# Patient Record
Sex: MALE | Race: WHITE | Employment: FULL TIME | URBAN - METROPOLITAN AREA
[De-identification: names, ages, dates, MRNs, and addresses within clinical notes are randomized per-mention and may not be internally consistent; named-entity substitution may affect disease eponyms.]

---

## 2017-11-13 ENCOUNTER — ALLIED HEALTH/NURSE VISIT (OUTPATIENT)
Dept: NURSING | Facility: CLINIC | Age: 42
End: 2017-11-13
Payer: COMMERCIAL

## 2017-11-13 DIAGNOSIS — Z23 NEED FOR PROPHYLACTIC VACCINATION AND INOCULATION AGAINST INFLUENZA: Primary | ICD-10-CM

## 2017-11-13 PROCEDURE — 99207 ZZC NO CHARGE NURSE ONLY: CPT

## 2017-11-13 PROCEDURE — 90686 IIV4 VACC NO PRSV 0.5 ML IM: CPT

## 2017-11-13 PROCEDURE — 90471 IMMUNIZATION ADMIN: CPT

## 2017-11-13 NOTE — PROGRESS NOTES

## 2017-11-13 NOTE — MR AVS SNAPSHOT
After Visit Summary   11/13/2017    Cy Harris    MRN: [id number]           Patient Information     Date Of Birth          1975        Visit Information        Provider Department      11/13/2017 4:00 PM  NURSE [facility]        Today's Diagnoses     Need for prophylactic vaccination and inoculation against influenza    -  1       Follow-ups after your visit        Who to contact     If you have questions or need follow up information about today's clinic visit or your schedule please contact [facility] directly at [phone].  Normal or non-critical lab and imaging results will be communicated to you by [employer], letter or phone within 4 business days after the clinic has received the results. If you do not hear from us within 7 days, please contact the clinic through "[employer]t or phone. If you have a critical or abnormal lab result, we will notify you by phone as soon as possible.  Submit refill requests through [employer] or call your pharmacy and they will forward the refill request to us. Please allow 3 business days for your refill to be completed.          Additional Information About Your Visit        MyChart Information     [employer] gives you secure access to your electronic health record. If you see a primary care provider, you can also send messages to your care team and make appointments. If you have questions, please call your primary care clinic.  If you do not have a primary care provider, please call [phone] and they will assist you.        Care EveryWhere ID     This is your Care EveryWhere ID. This could be used by other organizations to access your [facility] medical records  [id number]         Blood Pressure from Last 3 Encounters:   04/15/15 123/68   04/14/15 118/70   04/09/15 112/75    Weight from Last 3 Encounters:   04/15/15 148 lb (67.1 kg)   04/14/15 148 lb 8 oz (67.4 kg)   04/09/15 147 lb (66.7 kg)              We Performed the  Following     FLU VAC, SPLIT VIRUS IM > 3 YO (QUADRIVALENT) [97271]     Vaccine Administration, Initial [[id number]]        Primary Care Provider Office Phone # Fax #    Kevin Phelan [phone] [address] [place] [address]        Equal Access to Services     [facility]: Hadii aad ku hadasho Soomaali, waaxda luqadaha, qaybta kaalmada adeegyada, waxay ramosin haycindan adesunita shaymalena laazul castro. So [facility] [phone].    ATENCIÓN: Si habla español, tiene a morton disposición servicios gratuitos de asistencia lingüística. Ll[facility] [phone].    We comply with applicable federal civil rights laws and [place] laws. We do not discriminate on the basis of race, color, national origin, age, disability, sex, sexual orientation, or gender identity.            Thank you!     Thank you for choosing [facility]  for your care. Our goal is always to provide you with excellent care. Hearing back from our patients is one way we can continue to improve our services. Please take a few minutes to complete the written survey that you may receive in the mail after your visit with us. Thank you!             Your Updated Medication List - Protect others around you: Learn how to safely use, store and throw away your medicines at www.disposemymeds.org.          This list is accurate as of: 11/13/17  4:17 PM.  Always use your most recent med list.                   Brand Name Dispense Instructions for use Diagnosis    CLARITIN 10 MG tablet   Generic drug:  loratadine     30 tablet    Take 1 tablet (10 mg) by mouth daily        HYDROcodone-acetaminophen 5-325 MG per tablet    NORCO    30 tablet    Take 1-2 tablets by mouth every 4 hours as needed for other (Moderate to Severe Pain)    Inguinal hernia without mention of obstruction or gangrene, bilateral, (not specified as recurrent)       MULTI-VITAMIN GUMMIES PO      Take 2 tablets by mouth daily

## 2017-12-24 ENCOUNTER — NURSE TRIAGE (OUTPATIENT)
Dept: NURSING | Facility: CLINIC | Age: 42
End: 2017-12-24

## 2017-12-24 NOTE — TELEPHONE ENCOUNTER
"  Reason for Disposition    Caller has medication question about med not prescribed by PCP and triager unable to answer question (e.g., compatibility with other med, storage)     Caller had questions about using Flonase with Afrin and sudafed.    Spoke with [employer] control, questions answered about use.  Space flonase and Afrin apart by 1-2 hours, ok to use together.    Afrin and Sudafed have same action, don't use together, use one or the other.    Additional Information    Negative: Drug overdose and nurse unable to answer question    Negative: Caller requesting information not related to medicine    Negative: Caller requesting a prescription for Strep throat and has a positive culture result    Negative: Rash while taking a medication or within 3 days of stopping it    Negative: Immunization reaction suspected    Negative: [1] Asthma and [2] having symptoms of asthma (cough, wheezing, etc)    Negative: MORE THAN A DOUBLE DOSE of a prescription or over-the-counter (OTC) drug    Negative: [1] DOUBLE DOSE (an extra dose or lesser amount) of over-the-counter (OTC) drug AND [2] any symptoms (e.g., dizziness, nausea, pain, sleepiness)    Negative: [1] DOUBLE DOSE (an extra dose or lesser amount) of prescription drug AND [2] any symptoms (e.g., dizziness, nausea, pain, sleepiness)    Negative: Took another person's prescription drug    Negative: [1] DOUBLE DOSE (an extra dose or lesser amount) of prescription drug AND [2] NO symptoms (Exception: a double dose of antibiotics)    Negative: Diabetes drug error or overdose (e.g., insulin or extra dose)    Negative: [1] Request for URGENT new prescription or refill of \"essential\" medication (i.e., likelihood of harm to patient if not taken) AND [2] triager unable to fill per unit policy    Negative: [1] Prescription not at pharmacy AND [2] was prescribed today by PCP    Negative: Pharmacy calling with prescription questions and triager unable to answer question    " Negative: Caller has URGENT medication question about med that PCP prescribed and triager unable to answer question    Negative: Caller has NON-URGENT medication question about med that PCP prescribed and triager unable to answer question    Negative: Caller requesting a NON-URGENT new prescription or refill and triager unable to refill per unit policy    Protocols used: MEDICATION QUESTION CALL-ADULT-      Sangeetha Freeman, RN  [facility]

## 2018-01-05 ENCOUNTER — OFFICE VISIT (OUTPATIENT)
Dept: FAMILY MEDICINE | Facility: CLINIC | Age: 43
End: 2018-01-05
Payer: COMMERCIAL

## 2018-01-05 VITALS
WEIGHT: 154.7 LBS | DIASTOLIC BLOOD PRESSURE: 78 MMHG | TEMPERATURE: 97.6 F | HEIGHT: 68 IN | SYSTOLIC BLOOD PRESSURE: 120 MMHG | RESPIRATION RATE: 16 BRPM | HEART RATE: 72 BPM | BODY MASS INDEX: 23.45 KG/M2 | OXYGEN SATURATION: 100 %

## 2018-01-05 DIAGNOSIS — H69.91 DYSFUNCTION OF RIGHT EUSTACHIAN TUBE: Primary | ICD-10-CM

## 2018-01-05 PROCEDURE — 99213 OFFICE O/P EST LOW 20 MIN: CPT | Performed by: FAMILY MEDICINE

## 2018-01-05 RX ORDER — PREDNISONE 50 MG/1
TABLET ORAL DAILY
COMMUNITY
End: 2021-10-15

## 2018-01-05 RX ORDER — FLUTICASONE PROPIONATE 50 MCG
1 SPRAY, SUSPENSION (ML) NASAL DAILY
COMMUNITY

## 2018-01-05 NOTE — MR AVS SNAPSHOT
After Visit Summary   1/5/2018    Cy Harris    MRN: [id number]           Patient Information     Date Of Birth          1975        Visit Information        Provider Department      1/5/2018 9:50 AM Renetta Velasquez MD [facility]        Care Instructions    Guaifenisen may be helpful for the sinus symptoms. This is now available over the counter in the dose of 600 mg each (Mucinex and Humibid are a couple brand names). This helps break up mucus, and can help with drainage.  You can take 1-2 tabs twice per day.    Keep up with fluids (water).  Humidity can be helpful, such as showers, warm tea.    Affrin can help as you get on the airplaine. You do not want to use this more than a couple days in the row, because you can get rebound symptoms.            Follow-ups after your visit        Who to contact     If you have questions or need follow up information about today's clinic visit or your schedule please contact [facility] directly at [phone].  Normal or non-critical lab and imaging results will be communicated to you by [employer], letter or phone within 4 business days after the clinic has received the results. If you do not hear from us within 7 days, please contact the clinic through [employer] or phone. If you have a critical or abnormal lab result, we will notify you by phone as soon as possible.  Submit refill requests through [employer] or call your pharmacy and they will forward the refill request to us. Please allow 3 business days for your refill to be completed.          Additional Information About Your Visit        [employer]cracy Information     [employer] gives you secure access to your electronic health record. If you see a primary care provider, you can also send messages to your care team and make appointments. If you have questions, please call your primary care clinic.  If you do not have a primary care provider, please call [phone] and they will assist  "you.        Care EveryWhere ID     This is your Care EveryWhere ID. This could be used by other organizations to access your [facility] medical records  [id number]        Your Vitals Were     Pulse Temperature Respirations Height Pulse Oximetry BMI (Body Mass Index)    72 97.6  F (36.4  C) (Oral) 16 5' 8\" (1.727 m) 100% 23.52 kg/m2       Blood Pressure from Last 3 Encounters:   01/05/18 120/78   04/15/15 123/68   04/14/15 118/70    Weight from Last 3 Encounters:   01/05/18 154 lb 11.2 oz (70.2 kg)   04/15/15 148 lb (67.1 kg)   04/14/15 148 lb 8 oz (67.4 kg)              Today, you had the following     No orders found for display       Primary Care Provider Office Phone # Fax #    Kevin Phelan [phone] [address] [place] [address]        Equal Access to Services     [facility]: Hadii aad ku hadasho Soomaali, waaxda luqadaha, qaybta kaalmada adeegyada, waxay idiin haycindan howard carlin . So [facility] [phone].    ATENCIÓN: Si habla español, tiene a morton disposición servicios gratuitos de asistencia lingüística. Llame al [phone].    We comply with applicable federal civil rights laws and [place] laws. We do not discriminate on the basis of race, color, national origin, age, disability, sex, sexual orientation, or gender identity.            Thank you!     Thank you for choosing [facility]  for your care. Our goal is always to provide you with excellent care. Hearing back from our patients is one way we can continue to improve our services. Please take a few minutes to complete the written survey that you may receive in the mail after your visit with us. Thank you!             Your Updated Medication List - Protect others around you: Learn how to safely use, store and throw away your medicines at www.disposemymeds.org.          This list is accurate as of: 1/5/18 10:24 AM.  Always use your most recent med list.                   Brand Name " Dispense Instructions for use Diagnosis    AMOXICILLIN PO      Take by mouth daily        AUGMENTIN 875-125 MG per tablet   Generic drug:  amoxicillin-clavulanate      Take 1 tablet by mouth 2 times daily For 10 days        CLARITIN 10 MG tablet   Generic drug:  loratadine     30 tablet    Take 1 tablet (10 mg) by mouth daily        FLONASE ALLERGY RELIEF 50 MCG/ACT spray   Generic drug:  fluticasone      Spray 1 spray into both nostrils daily        HYDROcodone-acetaminophen 5-325 MG per tablet    NORCO    30 tablet    Take 1-2 tablets by mouth every 4 hours as needed for other (Moderate to Severe Pain)    Inguinal hernia without mention of obstruction or gangrene, bilateral, (not specified as recurrent)       MULTI-VITAMIN GUMMIES PO      Take 2 tablets by mouth daily        predniSONE 50 MG tablet    DELTASONE     Take by mouth daily For 5 days

## 2018-01-05 NOTE — PROGRESS NOTES
SUBJECTIVE:   Cy Harris is a 42 year old male who presents to clinic today for the following health issues:      Ear issue      Duration:  X 2 weeks- is a     Description (location/character/radiation): right ear- plugged     Intensity:  Mild to moderate    Accompanying signs and symptoms: a little sinus congestion    History (similar episodes/previous evaluation): yes    Precipitating or alleviating factors: Went to the [facility] on Sunday placed on amocillin and prednisone    Therapies tried and outcome: amoxicillin and prednisone, mucincex, afrin, flonase, sudafed             Problem list and histories reviewed & adjusted, as indicated.  Additional history:     See under ROS     Patient Active Problem List   Diagnosis     CARDIOVASCULAR SCREENING; LDL GOAL LESS THAN 160     Bilateral inguinal hernia       Current Outpatient Prescriptions   Medication Sig Dispense Refill     AMOXICILLIN PO Take by mouth daily       predniSONE (DELTASONE) 50 MG tablet Take by mouth daily       fluticasone (FLONASE ALLERGY RELIEF) 50 MCG/ACT spray Spray 1 spray into both nostrils daily       HYDROcodone-acetaminophen (NORCO) 5-325 MG per tablet Take 1-2 tablets by mouth every 4 hours as needed for other (Moderate to Severe Pain) (Patient not taking: Reported on 1/5/2018) 30 tablet 0     Multiple Vitamins-Minerals (MULTI-VITAMIN GUMMIES PO) Take 2 tablets by mouth daily       loratadine (CLARITIN) 10 MG tablet Take 1 tablet (10 mg) by mouth daily 30 tablet 1       Reviewed and updated as needed this visit by clinical staffTobacco  Allergies  Med Hx  Surg Hx  Fam Hx  Soc Hx      Reviewed and updated as needed this visit by Provider         ROS:  CONSTITUTIONAL:NEGATIVE for fever, chills, change in weight  ENT/MOUTH: see below  RESP:NEGATIVE for significant cough or SOB  PSYCHIATRIC: NEGATIVE for changes in mood or affect    Right ear bothering him currently.  He is a ; would like things looked at prior to flying  "again this coming weekend.    Started a month ago.  Had an infection.  Ear popping.    Sudafed helped.  Stayed home.  Using flonase.   Last year with similar issue.  flonase was helpful then.    Using saline.    Last week had a 3 day trip. .  First day, left ear unplugged.  Right ear has stayed plugged.  Not much pain. Only pain was brief right ear.  Was blowing lots of stuff from nose.     Sunday am, went to [facility].  Was noted to have right OM.    Supposed to go back in on Sunday.  Notes ear was popping on his way over here today.     OBJECTIVE:     /78 (BP Location: Right arm, Cuff Size: Adult Regular)  Pulse 72  Temp 97.6  F (36.4  C) (Oral)  Resp 16  Ht 5' 8\" (1.727 m)  Wt 154 lb 11.2 oz (70.2 kg)  SpO2 100%  BMI 23.52 kg/m2  Body mass index is 23.52 kg/(m^2).  GENERAL APPEARANCE: alert and no distress  HENT: ear canals and TM's normal  CV: regular rates and rhythm  PSYCH: mentation appears normal and affect normal/bright    Reviewed chart note in Care Everywhere from [facility].     ASSESSMENT/PLAN:       Dysfunction of right eustachian tube  Not real painful at this time, but his job is as a .   He was recently dx with OM and treated with antibiotics and prednisone. Last prednisone is today.    Discussed what this is, with a diagram.  Discussed mucinex, fluids.  Could use afrin for short term; not to use chronically due to rebound symptoms.   He is on flonase.   Will complete the antibiotic and prednisone.   Discussed seeing ENT if not improving; he will contact us if wanting to proceed.   He anticipates he may take a short term leave from this upcoming 4-5 day flight; after that he will have some on the ground training.       Follow up prn or as previously directed.    Patient Instructions   Guaifenisen may be helpful for the sinus symptoms. This is now available over the counter in the dose of 600 mg each (Mucinex and Humibid are a couple brand names). This helps break up " mucus, and can help with drainage.  You can take 1-2 tabs twice per day.    Keep up with fluids (water).  Humidity can be helpful, such as showers, warm tea.    Affrin can help as you get on the airplaine. You do not want to use this more than a couple days in the row, because you can get rebound symptoms.        Renetta Velasquez MD, MD  [facility]

## 2018-01-05 NOTE — PATIENT INSTRUCTIONS
Guaifenisen may be helpful for the sinus symptoms. This is now available over the counter in the dose of 600 mg each (Mucinex and Humibid are a couple brand names). This helps break up mucus, and can help with drainage.  You can take 1-2 tabs twice per day.    Keep up with fluids (water).  Humidity can be helpful, such as showers, warm tea.    Affrin can help as you get on the airplaine. You do not want to use this more than a couple days in the row, because you can get rebound symptoms.

## 2018-01-11 ENCOUNTER — TELEPHONE (OUTPATIENT)
Dept: FAMILY MEDICINE | Facility: CLINIC | Age: 43
End: 2018-01-11

## 2018-01-11 NOTE — TELEPHONE ENCOUNTER
Reason for call:  Form   Our goal is to have forms completed within 72 hours, however some forms may require a visit or additional information.     Who is the form from? Patient  Where did the form come from? Patient or family brought in     What clinic location was the form placed at? [facility] near    Where was the form placed? 's Box  What number is listed as a contact on the form? [phone]      Phone call message - patient request for a letter, form or note:     Date needed: as soon as possible  Please fax to [phone]  Has the patient signed a consent form for release of information? YES    Additional comments: please mail original to the patient once its complete     Type of letter, form or note: medical      Phone number to reach patient:  Home number on file [phone] (home)    Best Time:  Any     Can we leave a detailed message on this number?  YES

## 2018-11-15 ENCOUNTER — ALLIED HEALTH/NURSE VISIT (OUTPATIENT)
Dept: NURSING | Facility: CLINIC | Age: 43
End: 2018-11-15
Payer: COMMERCIAL

## 2018-11-15 DIAGNOSIS — Z23 NEED FOR PROPHYLACTIC VACCINATION AND INOCULATION AGAINST INFLUENZA: Primary | ICD-10-CM

## 2018-11-15 PROCEDURE — 90686 IIV4 VACC NO PRSV 0.5 ML IM: CPT

## 2018-11-15 PROCEDURE — 90471 IMMUNIZATION ADMIN: CPT

## 2018-11-15 NOTE — MR AVS SNAPSHOT
After Visit Summary   11/15/2018    Cy Harris    MRN: [id number]           Patient Information     Date Of Birth          1975        Visit Information        Provider Department      11/15/2018 3:50 PM [facility] NURSE [facility]        Today's Diagnoses     Need for prophylactic vaccination and inoculation against influenza    -  1       Follow-ups after your visit        Your next 10 appointments already scheduled     Nov 15, 2018  3:50 PM CST   Nurse Only with [facility] NURSE   [facility] ([facility])    [address]  [address]  [facility] [address] [phone]              Who to contact     If you have questions or need follow up information about today's clinic visit or your schedule please contact [facility] directly at [phone].  Normal or non-critical lab and imaging results will be communicated to you by MyChart, letter or phone within 4 business days after the clinic has received the results. If you do not hear from us within 7 days, please contact the clinic through MyChart or phone. If you have a critical or abnormal lab result, we will notify you by phone as soon as possible.  Submit refill requests through [employer] or call your pharmacy and they will forward the refill request to us. Please allow 3 business days for your refill to be completed.          Additional Information About Your Visit        MyChart Information     [employer] gives you secure access to your electronic health record. If you see a primary care provider, you can also send messages to your care team and make appointments. If you have questions, please call your primary care clinic.  If you do not have a primary care provider, please call [phone] and they will assist you.        Care EveryWhere ID     This is your Care EveryWhere ID. This could be used by other organizations to access your [facility] medical records  [id number]          Blood Pressure from Last 3 Encounters:   01/05/18 120/78   04/15/15 123/68   04/14/15 118/70    Weight from Last 3 Encounters:   01/05/18 154 lb 11.2 oz (70.2 kg)   04/15/15 148 lb (67.1 kg)   04/14/15 148 lb 8 oz (67.4 kg)              We Performed the Following     FLU VACCINE, SPLIT VIRUS, IM (QUADRIVALENT) [22130]- >3 YRS     Vaccine Administration, Initial [[id number]]        Primary Care Provider Office Phone # Fax #    Kevin Phelan [phone] [address] [place] [address]        Equal Access to Services     [facility]: Nieves Multani, sam beaver, jadiel black, mohan castro. So [facility] [phone].    ATENCIÓN: Si habla español, tiene a morton disposición servicios gratuitos de asistencia lingüística. Llame al [phone].    We comply with applicable federal civil rights laws and [place] laws. We do not discriminate on the basis of race, color, national origin, age, disability, sex, sexual orientation, or gender identity.            Thank you!     Thank you for choosing [facility]  for your care. Our goal is always to provide you with excellent care. Hearing back from our patients is one way we can continue to improve our services. Please take a few minutes to complete the written survey that you may receive in the mail after your visit with us. Thank you!             Your Updated Medication List - Protect others around you: Learn how to safely use, store and throw away your medicines at www.disposemymeds.org.          This list is accurate as of 11/15/18  3:45 PM.  Always use your most recent med list.                   Brand Name Dispense Instructions for use Diagnosis    AMOXICILLIN PO      Take by mouth daily        CLARITIN 10 MG tablet   Generic drug:  loratadine     30 tablet    Take 1 tablet (10 mg) by mouth daily        FLONASE ALLERGY RELIEF 50 MCG/ACT spray   Generic drug:  fluticasone       Spray 1 spray into both nostrils daily        HYDROcodone-acetaminophen 5-325 MG per tablet    NORCO    30 tablet    Take 1-2 tablets by mouth every 4 hours as needed for other (Moderate to Severe Pain)    Inguinal hernia without mention of obstruction or gangrene, bilateral, (not specified as recurrent)       MULTI-VITAMIN GUMMIES PO      Take 2 tablets by mouth daily        predniSONE 50 MG tablet    DELTASONE     Take by mouth daily For 5 days

## 2019-07-18 ENCOUNTER — OFFICE VISIT (OUTPATIENT)
Dept: PEDIATRICS | Facility: CLINIC | Age: 44
End: 2019-07-18
Payer: COMMERCIAL

## 2019-07-18 VITALS
OXYGEN SATURATION: 97 % | WEIGHT: 151.8 LBS | HEIGHT: 68 IN | DIASTOLIC BLOOD PRESSURE: 66 MMHG | HEART RATE: 78 BPM | TEMPERATURE: 97.8 F | SYSTOLIC BLOOD PRESSURE: 108 MMHG | BODY MASS INDEX: 23.01 KG/M2

## 2019-07-18 DIAGNOSIS — J32.9 SINUSITIS, UNSPECIFIED CHRONICITY, UNSPECIFIED LOCATION: Primary | ICD-10-CM

## 2019-07-18 PROCEDURE — 99213 OFFICE O/P EST LOW 20 MIN: CPT | Performed by: NURSE PRACTITIONER

## 2019-07-18 ASSESSMENT — MIFFLIN-ST. JEOR: SCORE: 1543.06

## 2019-07-18 NOTE — PROGRESS NOTES
Subjective     Cy Harris is a 44 year old male who presents to clinic today for the following health issues:    HPI   Acute Illness   Acute illness concerns: cough/sinus issue  Onset: started 7/5 - states went to [facility] and got antibiotics (amoxicillin) - finished - helped but still having issue    Fever: no     Chills/Sweats: no     Headache (location?): no     Sinus Pressure:no    Conjunctivitis:  no    Ear Pain: no    Rhinorrhea: YES- in mornings, back of throat drainage    Congestion: YES    Sore Throat: no     Cough: YES-productive of yellow sputum, productive of green sputum    Wheeze: no    Decreased Appetite: no    Nausea: no    Vomiting: no    Diarrhea:  no    Dysuria/Freq.: no    Fatigue/Achiness: no    Sick/Strep Exposure: no     Therapies Tried and outcome: sudafed, mucinex, claritin, flonase, and montelukast    Sx started 6/6. Had sinus pressure, fatigue, and nasal drainage.  [facility] Got [facility]in for sinusitis. Had sinus pressure, fatigue, and nasal drainage.   Is good about flonase, claritin, and Singulair because he has a history of allergies.  Has had cough with it, dry.  Taking Sudafed and Mucinex.     Thinks Augmentin helped. Has some thick drainage, less sinus pressure. Still has cough.       -------------------------------------    Patient Active Problem List   Diagnosis     CARDIOVASCULAR SCREENING; LDL GOAL LESS THAN 160     Bilateral inguinal hernia     Past Surgical History:   Procedure Laterality Date     HC TOOTH EXTRACTION W/FORCEP       HERNIORRHAPHY INGUINAL  12/26/2013    Procedure: HERNIORRHAPHY INGUINAL;  HERNIORRHAPHY INGUINAL- LEFT REPAIR WITH MESH ;  Surgeon: Bob Leigh MD;  Location:  OR     HERNIORRHAPHY INGUINAL Right 4/15/2015    Procedure: HERNIORRHAPHY INGUINAL;  Surgeon: Bob Leigh MD;  Location:  OR       Social History     Tobacco Use     Smoking status: Never Smoker     Smokeless tobacco: Never Used   Substance Use  "Topics     Alcohol use: Yes     Comment: 1-2 weekly     Family History   Problem Relation Age of Onset     Cancer Mother         Ovarian     Hypertension Mother      Cardiovascular Maternal Grandmother      Prostate Cancer Paternal Grandfather            Reviewed and updated as needed this visit by Provider         Review of Systems   ROS COMP: Constitutional, HEENT, cardiovascular, pulmonary, GI, , musculoskeletal, neuro, skin, endocrine and psych systems are negative, except as otherwise noted.      Objective    /66 (BP Location: Right arm, Cuff Size: Adult Regular)   Pulse 78   Temp 97.8  F (36.6  C) (Tympanic)   Ht 1.727 m (5' 8\")   Wt 68.9 kg (151 lb 12.8 oz)   SpO2 97%   BMI 23.08 kg/m    Body mass index is 23.08 kg/m .  Physical Exam   GENERAL: healthy, alert and no distress  NECK: no adenopathy, no asymmetry, masses, or scars and thyroid normal to palpation  RESP: lungs clear to auscultation - no rales, rhonchi or wheezes  CV: regular rate and rhythm, normal S1 S2, no S3 or S4, no murmur, click or rub, no peripheral edema and peripheral pulses strong    Diagnostic Test Results:  Labs reviewed in Epic        Assessment & Plan   (J32.9) Sinusitis, unspecified chronicity, unspecified location  (primary encounter diagnosis)  Comment: Improving. It sounds like there is no longer an infection but still having lots of drainage. Already on flonase, mucinex, and decongestants.  Plan:   Continue current cares. If no improvement in 1-2 weeks could consider steroids vs second round of antibiotics if it sounds like he's getting a second infection.   Will fill out [facility] paperwork. He is a  and doesn't wan to fly while congested.       See Patient Instructions    No follow-ups on file.    PILAR [facility] GARRY    "

## 2019-07-18 NOTE — PATIENT INSTRUCTIONS
Please message me Monday if not continuing to improve. We could consider a second course of antibiotics and oral steroids if needed.

## 2019-07-29 ENCOUNTER — TELEPHONE (OUTPATIENT)
Dept: PEDIATRICS | Facility: CLINIC | Age: 44
End: 2019-07-29

## 2019-07-29 NOTE — TELEPHONE ENCOUNTER
Letter received from [facility] to fill out paperwork for leave of absence by 8/2/19.  No paperwork enclosed.  Called [facility] at [phone] to request forms be sent.  Spoke with rep who will resend paperwork today.  Awaiting forms.  Dang Garcia, CMA

## 2020-02-23 ENCOUNTER — HEALTH MAINTENANCE LETTER (OUTPATIENT)
Age: 45
End: 2020-02-23

## 2020-12-06 ENCOUNTER — HEALTH MAINTENANCE LETTER (OUTPATIENT)
Age: 45
End: 2020-12-06

## 2021-04-11 ENCOUNTER — HEALTH MAINTENANCE LETTER (OUTPATIENT)
Age: 46
End: 2021-04-11

## 2021-06-23 ENCOUNTER — OFFICE VISIT (OUTPATIENT)
Dept: ORTHOPEDICS | Facility: CLINIC | Age: 46
End: 2021-06-23
Payer: COMMERCIAL

## 2021-06-23 VITALS
WEIGHT: 154 LBS | SYSTOLIC BLOOD PRESSURE: 138 MMHG | BODY MASS INDEX: 22.81 KG/M2 | HEIGHT: 69 IN | DIASTOLIC BLOOD PRESSURE: 92 MMHG

## 2021-06-23 DIAGNOSIS — S62.639A CLOSED FRACTURE OF TUFT OF DISTAL PHALANX OF FINGER: Primary | ICD-10-CM

## 2021-06-23 PROCEDURE — 99203 OFFICE O/P NEW LOW 30 MIN: CPT | Performed by: FAMILY MEDICINE

## 2021-06-23 ASSESSMENT — MIFFLIN-ST. JEOR: SCORE: 1550.98

## 2021-06-23 NOTE — LETTER
June 23, 2021      Cy Harris  [address] [address]        To Whom It May Concern:    Cy Harris  was seen on 6/23/21.  Please excuse him from work until 7/9/21 due to injury.        Sincerely,        Albert Yeo, MD

## 2021-06-23 NOTE — LETTER
6/23/2021         RE: Cy Harris  [address] [address]        Dear Colleague,    Thank you for referring your patient, Cy Harris, to the [facility] [place]. Please see a copy of my visit note below.    ASSESSMENT & PLAN  Patient Instructions     1. Closed fracture of tuft of distal phalanx of finger      -Patient has right middle finger pain due to distal phalanx fracture  -Patient was placed in a Stax splint today.  -Patient will perform gentle range of motion exercises to minimize stiffness of the finger.  Patient should avoid any heavy pushing or pulling with that finger to minimize displacement of the fracture  -Patient will be held out of work as a  until 7/9/2021.  -Patient will follow up on 7/9/21 for repeat x-rays and reevaluation  -Call direct clinic number [[phone]] at any time with questions or concerns.    Albert Yeo MD [facility] and [facility] Medicine  [facility] [place]          -----    SUBJECTIVE  Cy Harris is a/an 46 year old Right handed male who is seen as a self referral for evaluation of right hand pain. The patient is seen by themselves. States had 2nd covid vaccine on 5/9/21    Onset: 1 week(s) ago. Patient describes injury as was using a boat lift and hit his finger.   Location of Pain: right middle finger  Rating of Pain at worst: 6/10  Rating of Pain Currently: 2/10  Worsened by: fully bending of the finger or hitting it  Better with: Advil and tylenol  Treatments tried: ice, Tylenol, ibuprofen, previous imaging (xray 6/13/21 Images in Pacs) and finger splint  Associated symptoms: swelling, tingling and brusing  Orthopedic history: NO  Relevant surgical history: NO  Social history: social history: works at      Past Medical History:   Diagnosis Date     Inguinal hernia, left      PONV (postoperative nausea and vomiting)      Social History     Socioeconomic History     Marital status:   "    Spouse name: Not on file     Number of children: Not on file     Years of education: Not on file     Highest education level: Not on file   Occupational History     Not on file   Social Needs     Financial resource strain: Not on file     Food insecurity     Worry: Not on file     Inability: Not on file     Transportation needs     Medical: Not on file     Non-medical: Not on file   Tobacco Use     Smoking status: Never Smoker     Smokeless tobacco: Never Used   Substance and Sexual Activity     Alcohol use: Yes     Comment: 1-2 weekly     Drug use: No     Sexual activity: Not on file   Lifestyle     Physical activity     Days per week: Not on file     Minutes per session: Not on file     Stress: Not on file   Relationships     Social connections     Talks on phone: Not on file     Gets together: Not on file     Attends [employer] service: Not on file     Active member of club or organization: Not on file     Attends meetings of clubs or organizations: Not on file     Relationship status: Not on file     Intimate partner violence     Fear of current or ex partner: Not on file     Emotionally abused: Not on file     Physically abused: Not on file     Forced sexual activity: Not on file   Other Topics Concern     Parent/sibling w/ CABG, MI or angioplasty before 65F 55M? Not Asked   Social History Narrative     Not on file         Patient's past medical, surgical, social, and family histories were reviewed today and no changes are noted.    REVIEW OF SYSTEMS:  10 point ROS is negative other than symptoms noted above in HPI, Past Medical History or as stated below  Constitutional: NEGATIVE for fever, chills, change in weight  Skin: NEGATIVE for worrisome rashes, moles or lesions  GI/: NEGATIVE for bowel or bladder changes  Neuro: NEGATIVE for weakness, dizziness or paresthesias    OBJECTIVE:  BP (!) 138/92   Ht 1.74 m (5' 8.5\")   Wt 69.9 kg (154 lb)   BMI 23.08 kg/m     General: healthy, alert and in no " distress  HEENT: no scleral icterus or conjunctival erythema  Skin: no suspicious lesions or rash. No jaundice.  CV: regular rhythm by palpation  Resp: normal respiratory effort without conversational dyspnea   Psych: normal mood and affect  Gait: normal steady gait with appropriate coordination and balance  Neuro: normal light touch sensory exam of the bilateral hands.    MSK:  RIGHT HAND  Inspection:  Mild swelling and bruising over the distal third digit  Palpation:   Carpals: normal   Metacarpals: normal   Thumb: normal   Fingers: distal phalanx  Range of Motion:    flexion PIP limited by pain, flexion DIP limited by tightness limited by pain  Strength:    extension limited by pain, flexion limited by pain  Special Tests:    Positive: none    Negative: flexor digitorum superficialis testing, flexor digitorum profundus testing    Independent visualization of the below image:  No results found for this or any previous visit (from the past 24 hour(s)).    Personal review of xray of 3rd digit performed at outside center shows a mildly displaced comminuted fracture of the distal phalanx.        Albert Yeo MD [facility]        Again, thank you for allowing me to participate in the care of your patient.        Sincerely,        Albert Yeo, MD

## 2021-06-23 NOTE — PROGRESS NOTES
ASSESSMENT & PLAN  Patient Instructions     1. Closed fracture of tuft of distal phalanx of finger      -Patient has right middle finger pain due to distal phalanx fracture  -Patient was placed in a Stax splint today.  -Patient will perform gentle range of motion exercises to minimize stiffness of the finger.  Patient should avoid any heavy pushing or pulling with that finger to minimize displacement of the fracture  -Patient will be held out of work as a  until 7/9/2021.  -Patient will follow up on 7/9/21 for repeat x-rays and reevaluation  -Call direct clinic number [[phone]] at any time with questions or concerns.    Albert Yeo MD [facility] Orthopedics and Sports Medicine  [facility]          -----    SUBJECTIVE  Cy Harris is a/an 46 year old Right handed male who is seen as a self referral for evaluation of right hand pain. The patient is seen by themselves. States had 2nd covid vaccine on 5/9/21    Onset: 1 week(s) ago. Patient describes injury as was using a boat lift and hit his finger.   Location of Pain: right middle finger  Rating of Pain at worst: 6/10  Rating of Pain Currently: 2/10  Worsened by: fully bending of the finger or hitting it  Better with: Advil and tylenol  Treatments tried: ice, Tylenol, ibuprofen, previous imaging (xray 6/13/21 Images in Pacs) and finger splint  Associated symptoms: swelling, tingling and brusing  Orthopedic history: NO  Relevant surgical history: NO  Social history: social history: works at      Past Medical History:   Diagnosis Date     Inguinal hernia, left      PONV (postoperative nausea and vomiting)      Social History     Socioeconomic History     Marital status:      Spouse name: Not on file     Number of children: Not on file     Years of education: Not on file     Highest education level: Not on file   Occupational History     Not on file   Social Needs     Financial resource strain: Not on file     Food insecurity  "    Worry: Not on file     Inability: Not on file     Transportation needs     Medical: Not on file     Non-medical: Not on file   Tobacco Use     Smoking status: Never Smoker     Smokeless tobacco: Never Used   Substance and Sexual Activity     Alcohol use: Yes     Comment: 1-2 weekly     Drug use: No     Sexual activity: Not on file   Lifestyle     Physical activity     Days per week: Not on file     Minutes per session: Not on file     Stress: Not on file   Relationships     Social connections     Talks on phone: Not on file     Gets together: Not on file     Attends [employer] service: Not on file     Active member of club or organization: Not on file     Attends meetings of clubs or organizations: Not on file     Relationship status: Not on file     Intimate partner violence     Fear of current or ex partner: Not on file     Emotionally abused: Not on file     Physically abused: Not on file     Forced sexual activity: Not on file   Other Topics Concern     Parent/sibling w/ CABG, MI or angioplasty before 65F 55M? Not Asked   Social History Narrative     Not on file         Patient's past medical, surgical, social, and family histories were reviewed today and no changes are noted.    REVIEW OF SYSTEMS:  10 point ROS is negative other than symptoms noted above in HPI, Past Medical History or as stated below  Constitutional: NEGATIVE for fever, chills, change in weight  Skin: NEGATIVE for worrisome rashes, moles or lesions  GI/: NEGATIVE for bowel or bladder changes  Neuro: NEGATIVE for weakness, dizziness or paresthesias    OBJECTIVE:  BP (!) 138/92   Ht 1.74 m (5' 8.5\")   Wt 69.9 kg (154 lb)   BMI 23.08 kg/m     General: healthy, alert and in no distress  HEENT: no scleral icterus or conjunctival erythema  Skin: no suspicious lesions or rash. No jaundice.  CV: regular rhythm by palpation  Resp: normal respiratory effort without conversational dyspnea   Psych: normal mood and affect  Gait: normal steady gait " with appropriate coordination and balance  Neuro: normal light touch sensory exam of the bilateral hands.    MSK:  RIGHT HAND  Inspection:  Mild swelling and bruising over the distal third digit  Palpation:   Carpals: normal   Metacarpals: normal   Thumb: normal   Fingers: distal phalanx  Range of Motion:    flexion PIP limited by pain, flexion DIP limited by tightness limited by pain  Strength:    extension limited by pain, flexion limited by pain  Special Tests:    Positive: none    Negative: flexor digitorum superficialis testing, flexor digitorum profundus testing    Independent visualization of the below image:  No results found for this or any previous visit (from the past 24 hour(s)).    Personal review of xray of 3rd digit performed at outside center shows a mildly displaced comminuted fracture of the distal phalanx.        Albert Yeo MD [facility] Sports and Orthopedic Care

## 2021-06-23 NOTE — PATIENT INSTRUCTIONS
1. Closed fracture of tuft of distal phalanx of finger      -Patient has right middle finger pain due to distal phalanx fracture  -Patient was placed in a Stax splint today.  -Patient will perform gentle range of motion exercises to minimize stiffness of the finger.  Patient should avoid any heavy pushing or pulling with that finger to minimize displacement of the fracture  -Patient will be held out of work as a  until 7/9/2021.  -Patient will follow up on 7/9/21 for repeat x-rays and reevaluation  -Call direct clinic number [[phone]] at any time with questions or concerns.    Albert Yeo MD CAM  [place] [facility]  [facility] [place]

## 2021-06-23 NOTE — NURSING NOTE
Cy to follow up with Primary Care provider regarding elevated blood pressure.    Dang Jaquez MS, ATC

## 2021-07-09 ENCOUNTER — ANCILLARY PROCEDURE (OUTPATIENT)
Dept: GENERAL RADIOLOGY | Facility: CLINIC | Age: 46
End: 2021-07-09
Attending: FAMILY MEDICINE
Payer: COMMERCIAL

## 2021-07-09 ENCOUNTER — OFFICE VISIT (OUTPATIENT)
Dept: ORTHOPEDICS | Facility: CLINIC | Age: 46
End: 2021-07-09
Payer: COMMERCIAL

## 2021-07-09 VITALS
WEIGHT: 154 LBS | SYSTOLIC BLOOD PRESSURE: 118 MMHG | HEIGHT: 69 IN | DIASTOLIC BLOOD PRESSURE: 80 MMHG | BODY MASS INDEX: 22.81 KG/M2

## 2021-07-09 DIAGNOSIS — S62.639A CLOSED FRACTURE OF TUFT OF DISTAL PHALANX OF FINGER: ICD-10-CM

## 2021-07-09 DIAGNOSIS — M79.644 FINGER PAIN, RIGHT: ICD-10-CM

## 2021-07-09 DIAGNOSIS — M79.644 FINGER PAIN, RIGHT: Primary | ICD-10-CM

## 2021-07-09 PROCEDURE — 73140 X-RAY EXAM OF FINGER(S): CPT | Mod: RT | Performed by: FAMILY MEDICINE

## 2021-07-09 PROCEDURE — 99213 OFFICE O/P EST LOW 20 MIN: CPT | Performed by: FAMILY MEDICINE

## 2021-07-09 ASSESSMENT — MIFFLIN-ST. JEOR: SCORE: 1550.98

## 2021-07-09 NOTE — LETTER
"    7/9/2021         RE: Cy Harris  [address]  [place] [address]        Dear Colleague,    Thank you for referring your patient, Cy Harris, to the [facility] [place]. Please see a copy of my visit note below.    ASSESSMENT & PLAN  Patient Instructions     1. Finger pain, right    2. Closed fracture of tuft of distal phalanx of finger      -Patient is following up for right middle finger pain due to a distal tuft fracture  -X-rays today show slightly increased displacement and minimal callus formation present  -Patient will continue with finger splint and will avoid weightbearing activity on the right hand is much as possible  -Patient will follow up on 7/28/2021 at 5 PM for repeat x-rays and reevaluation.  -Will be held out of work until his next appointment.  New note was given today  -Call direct clinic number [[phone]] at any time with questions or concerns.    Albert Yeo MD [facility] Orthopedics and Sports Medicine  [facility] [place]          -----    SUBJECTIVE:  Cy Harris is a 46 year old male who is seen in follow-up for right middle finger pain due to distal phalanx fracture.They were last seen 6/23/2021. DOI: 6/13/21 ~ 4 weeks ago    Since their last visit reports 55% improvement. States swelling has gone down. But still has pain with bending They indicate that their current pain level is 4/10 more just numbness and tingling and stiffness. They have tried ice, Tylenol, ibuprofen and finger splint.      The patient is seen by themselves.    Patient's past medical, surgical, social, and family histories were reviewed today and no changes are noted.    REVIEW OF SYSTEMS:  Constitutional: NEGATIVE for fever, chills, change in weight  Skin: NEGATIVE for worrisome rashes, moles or lesions  GI/: NEGATIVE for bowel or bladder changes  Neuro: NEGATIVE for weakness, dizziness or paresthesias    OBJECTIVE:  /80   Ht 1.74 m (5' 8.5\")   Wt " 69.9 kg (154 lb)   BMI 23.08 kg/m     General: healthy, alert and in no distress  HEENT: no scleral icterus or conjunctival erythema  Skin: no suspicious lesions or rash. No jaundice.  CV: regular rhythm by palpation, no pedal edema  Resp: normal respiratory effort without conversational dyspnea   Psych: normal mood and affect  Gait: normal steady gait with appropriate coordination and balance  Neuro: normal light touch sensory exam of the extremities.    MSK:  RIGHT HAND  Inspection:  Mild swelling and bruising over the distal third digit  Palpation:   Carpals: normal   Metacarpals: normal   Thumb: normal   Fingers: distal phalanx  Range of Motion:    flexion PIP limited by pain, flexion DIP limited by tightness limited by pain  Strength:    extension limited by pain, flexion limited by pain  Special Tests:    Positive: none    Negative: flexor digitorum superficialis testing, flexor digitorum profundus testing    Independent visualization of the below image:  Recent Results (from the past 24 hour(s))   XR Finger Right G/E 2 Views    Narrative    Minimally displaced distal tuft fracture of the third distal phalanx with   slightly increased displacement compared to previous x-ray.  Fracture   fragments are still in anatomical position however.  Minimal callus   formation present.         Patient's conditions were thoroughly discussed during today's visit with total time spent face-to-face with the patient documentation being 20 minutes.    Albert Yeo MD, [facility]            Again, thank you for allowing me to participate in the care of your patient.        Sincerely,        Albert Yeo, MD

## 2021-07-09 NOTE — PATIENT INSTRUCTIONS
1. Finger pain, right    2. Closed fracture of tuft of distal phalanx of finger      -Patient is following up for right middle finger pain due to a distal tuft fracture  -X-rays today show slightly increased displacement and minimal callus formation present  -Patient will continue with finger splint and will avoid weightbearing activity on the right hand is much as possible  -Patient will follow up on 7/28/2021 at 5 PM for repeat x-rays and reevaluation.  -Will be held out of work until his next appointment.  New note was given today  -Call direct clinic number [[phone]] at any time with questions or concerns.    Albert Yeo MD [facility]  [facility] [place]

## 2021-07-09 NOTE — PROGRESS NOTES
"ASSESSMENT & PLAN  Patient Instructions     1. Finger pain, right    2. Closed fracture of tuft of distal phalanx of finger      -Patient is following up for right middle finger pain due to a distal tuft fracture  -X-rays today show slightly increased displacement and minimal callus formation present  -Patient will continue with finger splint and will avoid weightbearing activity on the right hand is much as possible  -Patient will follow up on 7/28/2021 at 5 PM for repeat x-rays and reevaluation.  -Will be held out of work until his next appointment.  New note was given today  -Call direct clinic number [[phone]] at any time with questions or concerns.    Albert Yeo MD [facility] Orthopedics and Sports Medicine  [facility]          -----    SUBJECTIVE:  Cy Harris is a 46 year old male who is seen in follow-up for right middle finger pain due to distal phalanx fracture.They were last seen 6/23/2021. DOI: 6/13/21 ~ 4 weeks ago    Since their last visit reports 55% improvement. States swelling has gone down. But still has pain with bending They indicate that their current pain level is 4/10 more just numbness and tingling and stiffness. They have tried ice, Tylenol, ibuprofen and finger splint.      The patient is seen by themselves.    Patient's past medical, surgical, social, and family histories were reviewed today and no changes are noted.    REVIEW OF SYSTEMS:  Constitutional: NEGATIVE for fever, chills, change in weight  Skin: NEGATIVE for worrisome rashes, moles or lesions  GI/: NEGATIVE for bowel or bladder changes  Neuro: NEGATIVE for weakness, dizziness or paresthesias    OBJECTIVE:  /80   Ht 1.74 m (5' 8.5\")   Wt 69.9 kg (154 lb)   BMI 23.08 kg/m     General: healthy, alert and in no distress  HEENT: no scleral icterus or conjunctival erythema  Skin: no suspicious lesions or rash. No jaundice.  CV: regular rhythm by palpation, no pedal edema  Resp: normal respiratory " effort without conversational dyspnea   Psych: normal mood and affect  Gait: normal steady gait with appropriate coordination and balance  Neuro: normal light touch sensory exam of the extremities.    MSK:  RIGHT HAND  Inspection:  Mild swelling and bruising over the distal third digit  Palpation:   Carpals: normal   Metacarpals: normal   Thumb: normal   Fingers: distal phalanx  Range of Motion:    flexion PIP limited by pain, flexion DIP limited by tightness limited by pain  Strength:    extension limited by pain, flexion limited by pain  Special Tests:    Positive: none    Negative: flexor digitorum superficialis testing, flexor digitorum profundus testing    Independent visualization of the below image:  Recent Results (from the past 24 hour(s))   XR Finger Right G/E 2 Views    Narrative    Minimally displaced distal tuft fracture of the third distal phalanx with   slightly increased displacement compared to previous x-ray.  Fracture   fragments are still in anatomical position however.  Minimal callus   formation present.         Patient's conditions were thoroughly discussed during today's visit with total time spent face-to-face with the patient documentation being 20 minutes.    Albert Yeo MD, [facility] Orthopedic Care

## 2021-07-09 NOTE — LETTER
July 9, 2021      Cy Harris  [address] [address]        To Whom It May Concern:    Cy Harris  was seen on 7/9/21.  Please excuse him from work until 7/29/21 due to injury.        Sincerely,        Albert Yeo, MD

## 2021-07-22 ENCOUNTER — TELEPHONE (OUTPATIENT)
Dept: ORTHOPEDICS | Facility: CLINIC | Age: 46
End: 2021-07-22

## 2021-07-22 NOTE — TELEPHONE ENCOUNTER
Reason for Call:  Form, our goal is to have forms completed with 7 days, however, some forms may require a visit or additional information.    Type of letter, form or note:  short term disability     Who is the form from?: Insurance    Where did the form come from: form was faxed in    Phone number of person requesting form: [phone]  Can we leave a detailed message on this number:  NO    Desired completion date of form: asap      How will form be returned?:  fax to [phone]    Has the patient signed a consent form for release of information (may be included with form)? YES    Additional comments:     Form was started and place in Provider Basket for provider review/ completion at [facility].       Dang Jaquez MS, ATC

## 2021-07-27 ENCOUNTER — MYC MEDICAL ADVICE (OUTPATIENT)
Dept: ORTHOPEDICS | Facility: CLINIC | Age: 46
End: 2021-07-27

## 2021-07-28 ENCOUNTER — OFFICE VISIT (OUTPATIENT)
Dept: ORTHOPEDICS | Facility: CLINIC | Age: 46
End: 2021-07-28
Payer: COMMERCIAL

## 2021-07-28 ENCOUNTER — ANCILLARY PROCEDURE (OUTPATIENT)
Dept: GENERAL RADIOLOGY | Facility: CLINIC | Age: 46
End: 2021-07-28
Attending: FAMILY MEDICINE
Payer: COMMERCIAL

## 2021-07-28 VITALS
BODY MASS INDEX: 22.81 KG/M2 | WEIGHT: 154 LBS | DIASTOLIC BLOOD PRESSURE: 80 MMHG | HEIGHT: 69 IN | SYSTOLIC BLOOD PRESSURE: 120 MMHG

## 2021-07-28 DIAGNOSIS — S62.639A CLOSED FRACTURE OF TUFT OF DISTAL PHALANX OF FINGER: Primary | ICD-10-CM

## 2021-07-28 DIAGNOSIS — S62.639A CLOSED FRACTURE OF TUFT OF DISTAL PHALANX OF FINGER: ICD-10-CM

## 2021-07-28 DIAGNOSIS — S62.662G: ICD-10-CM

## 2021-07-28 PROCEDURE — 73140 X-RAY EXAM OF FINGER(S): CPT | Mod: RT | Performed by: FAMILY MEDICINE

## 2021-07-28 PROCEDURE — 99213 OFFICE O/P EST LOW 20 MIN: CPT | Performed by: FAMILY MEDICINE

## 2021-07-28 ASSESSMENT — MIFFLIN-ST. JEOR: SCORE: 1550.98

## 2021-07-28 NOTE — PATIENT INSTRUCTIONS
1. Closed fracture of tuft of distal phalanx of finger    2. Closed nondisplaced fracture of distal phalanx of right middle finger with delayed healing, subsequent encounter      -Patient is following up for right middle finger pain due to nondisplaced distal phalanx fracture  -X-rays taken in office today show no evidence of bony healing but no further displacement of the fracture fragments  -Patient will continue with finger Stax splints.  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    Albert Yeo MD [facility]  [facility] [place]

## 2021-07-28 NOTE — PROGRESS NOTES
"ASSESSMENT & PLAN  Patient Instructions     1. Closed fracture of tuft of distal phalanx of finger    2. Closed nondisplaced fracture of distal phalanx of right middle finger with delayed healing, subsequent encounter      -Patient is following up for right middle finger pain due to nondisplaced distal phalanx fracture  -X-rays taken in office today show no evidence of bony healing but no further displacement of the fracture fragments  -Patient will continue with finger Stax splints.  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    Albert Yeo MD [facility] Orthopedics and Sports Medicine  [facility]          -----    SUBJECTIVE:  Cy Harris is a 46 year old male who is seen in follow-up for right middle finger pain due to a distal tuft fracture.They were last seen 7/22/2021.     Since their last visit reports 70% improvement. States tip of finger feels better but still tingling They indicate that their current pain level is 1/10. They have tried finger brace.      The patient is seen by themselves.    Patient's past medical, surgical, social, and family histories were reviewed today and no changes are noted.    REVIEW OF SYSTEMS:  Constitutional: NEGATIVE for fever, chills, change in weight  Skin: NEGATIVE for worrisome rashes, moles or lesions  GI/: NEGATIVE for bowel or bladder changes  Neuro: NEGATIVE for weakness, dizziness or paresthesias    OBJECTIVE:  /80   Ht 1.74 m (5' 8.5\")   Wt 69.9 kg (154 lb)   BMI 23.08 kg/m     General: healthy, alert and in no " distress  HEENT: no scleral icterus or conjunctival erythema  Skin: no suspicious lesions or rash. No jaundice.  CV: regular rhythm by palpation, no pedal edema  Resp: normal respiratory effort without conversational dyspnea   Psych: normal mood and affect  Gait: normal steady gait with appropriate coordination and balance  Neuro: normal light touch sensory exam of the extremities.    MSK:  RIGHT HAND  Inspection:  No swelling, bruising, asymmetry or deformity  Palpation:   Carpals: normal   Metacarpals: normal   Thumb: normal   Fingers: distal phalanx  Range of Motion:    flexion PIP limited slightly by pain, flexion DIP limited by tightness limited by pain  Strength:    extension grossly intact, flexion limited by pain  Special Tests:    Positive: none    Negative: flexor digitorum superficialis testing, flexor digitorum profundus testing     Independent visualization of the below image:  Recent Results (from the past 24 hour(s))   XR Finger Right G/E 2 Views    Narrative    Minimally displaced distal tuft fracture of the third distal phalanx   unchanged compared to previous x-ray.  Minimal callus formation present.           Patient's conditions were thoroughly discussed during today's visit with total time spent face-to-face with the patient and documentation being 20 minutes.    Albert Yeo MD, [facility] Orthopedic Care

## 2021-07-28 NOTE — LETTER
July 28, 2021      Cy Harris  [address] [address]        To Whom It May Concern:    Cy Harris  was seen on 7/20/2021.  Please excuse him from work until 8/16/2021 due to injury.        Sincerely,        Albert Yeo, MD

## 2021-07-28 NOTE — LETTER
7/28/2021         RE: Cy Harris  [address] [address]        Dear Colleague,    Thank you for referring your patient, Cy Harris, to the [facility] [place]. Please see a copy of my visit note below.    ASSESSMENT & PLAN  Patient Instructions     1. Closed fracture of tuft of distal phalanx of finger    2. Closed nondisplaced fracture of distal phalanx of right middle finger with delayed healing, subsequent encounter      -Patient is following up for right middle finger pain due to nondisplaced distal phalanx fracture  -X-rays taken in office today show no evidence of bony healing but no further displacement of the fracture fragments  -Patient will continue with finger Stax splints.  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    Albert Yeo MD [facility] Orthopedics and Sports Medicine  [facility] [place]          -----    SUBJECTIVE:  Cy Harris is a 46 year old male who is seen in follow-up for right middle finger pain due to a distal tuft fracture.They were last seen 7/22/2021.     Since their last visit reports 70% improvement. States tip of finger feels better but still tingling They indicate that their current pain level is 1/10. They have tried finger brace.      The patient is seen by themselves.    Patient's past medical, surgical, social, and family histories were reviewed today and no changes are noted.    REVIEW OF SYSTEMS:  Constitutional: NEGATIVE for fever, chills, change in weight  Skin: NEGATIVE for worrisome rashes,  "moles or lesions  GI/: NEGATIVE for bowel or bladder changes  Neuro: NEGATIVE for weakness, dizziness or paresthesias    OBJECTIVE:  /80   Ht 1.74 m (5' 8.5\")   Wt 69.9 kg (154 lb)   BMI 23.08 kg/m     General: healthy, alert and in no distress  HEENT: no scleral icterus or conjunctival erythema  Skin: no suspicious lesions or rash. No jaundice.  CV: regular rhythm by palpation, no pedal edema  Resp: normal respiratory effort without conversational dyspnea   Psych: normal mood and affect  Gait: normal steady gait with appropriate coordination and balance  Neuro: normal light touch sensory exam of the extremities.    MSK:  RIGHT HAND  Inspection:  No swelling, bruising, asymmetry or deformity  Palpation:   Carpals: normal   Metacarpals: normal   Thumb: normal   Fingers: distal phalanx  Range of Motion:    flexion PIP limited slightly by pain, flexion DIP limited by tightness limited by pain  Strength:    extension grossly intact, flexion limited by pain  Special Tests:    Positive: none    Negative: flexor digitorum superficialis testing, flexor digitorum profundus testing     Independent visualization of the below image:  Recent Results (from the past 24 hour(s))   XR Finger Right G/E 2 Views    Narrative    Minimally displaced distal tuft fracture of the third distal phalanx   unchanged compared to previous x-ray.  Minimal callus formation present.           Patient's conditions were thoroughly discussed during today's visit with total time spent face-to-face with the patient and documentation being 20 minutes.    Albert Yeo MD, [facility] Orthopedic Care            Again, thank you for allowing me to participate in the care of your patient.        Sincerely,        Albert Yeo, MD    "

## 2021-08-14 ENCOUNTER — ANCILLARY PROCEDURE (OUTPATIENT)
Dept: GENERAL RADIOLOGY | Facility: CLINIC | Age: 46
End: 2021-08-14
Attending: FAMILY MEDICINE
Payer: COMMERCIAL

## 2021-08-14 ENCOUNTER — OFFICE VISIT (OUTPATIENT)
Dept: ORTHOPEDICS | Facility: CLINIC | Age: 46
End: 2021-08-14
Payer: COMMERCIAL

## 2021-08-14 VITALS
BODY MASS INDEX: 22.81 KG/M2 | DIASTOLIC BLOOD PRESSURE: 80 MMHG | HEIGHT: 69 IN | SYSTOLIC BLOOD PRESSURE: 118 MMHG | WEIGHT: 154 LBS

## 2021-08-14 DIAGNOSIS — S62.662G: ICD-10-CM

## 2021-08-14 DIAGNOSIS — S62.639A CLOSED FRACTURE OF TUFT OF DISTAL PHALANX OF FINGER: Primary | ICD-10-CM

## 2021-08-14 DIAGNOSIS — S62.639A CLOSED FRACTURE OF TUFT OF DISTAL PHALANX OF FINGER: ICD-10-CM

## 2021-08-14 PROCEDURE — 73140 X-RAY EXAM OF FINGER(S): CPT | Mod: RT | Performed by: FAMILY MEDICINE

## 2021-08-14 PROCEDURE — 99213 OFFICE O/P EST LOW 20 MIN: CPT | Performed by: FAMILY MEDICINE

## 2021-08-14 ASSESSMENT — MIFFLIN-ST. JEOR: SCORE: 1550.98

## 2021-08-14 NOTE — PROGRESS NOTES
ASSESSMENT & PLAN  Patient Instructions     1. Closed fracture of tuft of distal phalanx of finger    2. 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  If patient has increased pain after fight stimulator, he will call my office for urgent visit and reevaluation.    -Specific paperwork from patient's company was filled out today.  -Patient may return to full duty without restrictions on 8/26/2021 as tolerated.  -Patient will send a [employer] message once he knows his work schedule to determine follow-up appointment for repeat x-rays to ensure further healing is injury  -Call direct clinic number [[phone]] at any time with questions or concerns.    Albert Yeo MD [facility] Orthopedics and Sports Medicine  [facility] [place]          -----    SUBJECTIVE  Cy Harris is a 46 year old male who is seen in follow-up for right long finger.They were last seen 7/28/2021 .  2 months since injury. 6/14/21.  Feels it has been improving.  Normal sensation on the ulnar side of his finger.  Does feel the sensation is returning on the radial side, but slower.     Since their last visit reports 70% improvement. They indicate that their current pain level is 1/10. They have tried casting/splinting/bracing.      The patient is seen by themselves.    Patient's past medical, surgical, social, and family histories were reviewed today and no changes are noted.    REVIEW OF  "SYSTEMS:  Constitutional: NEGATIVE for fever, chills, change in weight  Skin: NEGATIVE for worrisome rashes, moles or lesions  GI/: NEGATIVE for bowel or bladder changes  Neuro: NEGATIVE for weakness, dizziness or paresthesias    OBJECTIVE:  /80   Ht 1.74 m (5' 8.5\")   Wt 69.9 kg (154 lb)   BMI 23.08 kg/m     General: healthy, alert and in no distress  HEENT: no scleral icterus or conjunctival erythema  Skin: no suspicious lesions or rash. No jaundice.  CV: regular rhythm by palpation, no pedal edema  Resp: normal respiratory effort without conversational dyspnea   Psych: normal mood and affect  Gait: normal steady gait with appropriate coordination and balance  Neuro: normal light touch sensory exam of the extremities.    MSK:  RIGHT HAND  Inspection:  No swelling, bruising, asymmetry or deformity  Palpation:   Carpals: normal   Metacarpals: normal   Thumb: normal   Fingers: distal phalanx  Range of Motion:    flexion PIP grossly intact, flexion DIP limited by tightness limited by pain  Strength:    extension grossly intact, flexion grossly intact  Special Tests:    Positive: none    Negative: flexor digitorum superficialis testing, flexor digitorum profundus testing    Independent visualization of the below image:  Recent Results (from the past 24 hour(s))   XR Finger Right G/E 2 Views    Narrative    Minimally displaced distal tuft fracture of third distal phalanx with   increased callus formation and interval healing compared to previous x-ray   performed on 7/28/2021.         Patient's conditions were thoroughly discussed during today's visit with face to face time, work note and documentation being 22 minutes.    Albert Yeo MD, [facility] Sports and Orthopedic Care        "

## 2021-08-14 NOTE — LETTER
8/14/2021         RE: Cy Harris  [address] [address]        Dear Colleague,    Thank you for referring your patient, Cy Harris, to the [facility] [place]. Please see a copy of my visit note below.    ASSESSMENT & PLAN  Patient Instructions     1. Closed fracture of tuft of distal phalanx of finger    2. 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  If patient has increased pain after fight stimulator, he will call my office for urgent visit and reevaluation.    -Specific paperwork from patient's company was filled out today.  -Patient may return to full duty without restrictions on 8/26/2021 as tolerated.  -Patient will send a [employer] message once he knows his work schedule to determine follow-up appointment for repeat x-rays to ensure further healing is injury  -Call direct clinic number [[phone]] at any time with questions or concerns.    Albert Yeo MD [facility] Orthopedics and Sports Medicine  [facility] [place]          -----    SUBJECTIVE  Cy Harris is a 46 year old male who is seen in follow-up for right long finger.They were last seen 7/28/2021 .  2 months since injury. 6/14/21.  Feels it has been improving.  Normal sensation on the ulnar side of his finger.  Does feel the sensation is returning on the radial side, but slower.     Since their last visit reports 70% improvement. They indicate  "that their current pain level is 1/10. They have tried casting/splinting/bracing.      The patient is seen by themselves.    Patient's past medical, surgical, social, and family histories were reviewed today and no changes are noted.    REVIEW OF SYSTEMS:  Constitutional: NEGATIVE for fever, chills, change in weight  Skin: NEGATIVE for worrisome rashes, moles or lesions  GI/: NEGATIVE for bowel or bladder changes  Neuro: NEGATIVE for weakness, dizziness or paresthesias    OBJECTIVE:  /80   Ht 1.74 m (5' 8.5\")   Wt 69.9 kg (154 lb)   BMI 23.08 kg/m     General: healthy, alert and in no distress  HEENT: no scleral icterus or conjunctival erythema  Skin: no suspicious lesions or rash. No jaundice.  CV: regular rhythm by palpation, no pedal edema  Resp: normal respiratory effort without conversational dyspnea   Psych: normal mood and affect  Gait: normal steady gait with appropriate coordination and balance  Neuro: normal light touch sensory exam of the extremities.    MSK:  RIGHT HAND  Inspection:  No swelling, bruising, asymmetry or deformity  Palpation:   Carpals: normal   Metacarpals: normal   Thumb: normal   Fingers: distal phalanx  Range of Motion:    flexion PIP grossly intact, flexion DIP limited by tightness limited by pain  Strength:    extension grossly intact, flexion grossly intact  Special Tests:    Positive: none    Negative: flexor digitorum superficialis testing, flexor digitorum profundus testing    Independent visualization of the below image:  Recent Results (from the past 24 hour(s))   XR Finger Right G/E 2 Views    Narrative    Minimally displaced distal tuft fracture of third distal phalanx with   increased callus formation and interval healing compared to previous x-ray   performed on 7/28/2021.         Patient's conditions were thoroughly discussed during today's visit with face to face time, work note and documentation being 22 minutes.    Albert Yeo MD, [facility]            Again, thank you for allowing me to participate in the care of your patient.        Sincerely,        Albert Yeo, MD

## 2021-08-14 NOTE — PATIENT INSTRUCTIONS
1. Closed fracture of tuft of distal phalanx of finger    2. 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  If patient has increased pain after fight stimulator, he will call my office for urgent visit and reevaluation.    -Specific paperwork from patient's company was filled out today.  -Patient may return to full duty without restrictions on 8/26/2021 as tolerated.  -Patient will send a [employer] message once he knows his work schedule to determine follow-up appointment for repeat x-rays to ensure further healing is injury  -Call direct clinic number [[phone]] at any time with questions or concerns.    Albert Yeo MD [facility]  [facility] [place]

## 2021-08-16 ENCOUNTER — MYC MEDICAL ADVICE (OUTPATIENT)
Dept: ORTHOPEDICS | Facility: CLINIC | Age: 46
End: 2021-08-16

## 2021-08-17 NOTE — TELEPHONE ENCOUNTER
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  I will talk to the patient later this week to set up a follow-up appointment for repeat x-rays in approximately 3 weeks.

## 2021-08-19 ENCOUNTER — MYC MEDICAL ADVICE (OUTPATIENT)
Dept: ORTHOPEDICS | Facility: CLINIC | Age: 46
End: 2021-08-19

## 2021-08-19 NOTE — TELEPHONE ENCOUNTER
[employer] Certification forms were received yesterday. Dr. Yeo completed forms and was waiting to hear back from the patient regarding a fax number for where to send the signed forms.    Patient sent [employer] message today 8/19 with fax information.    Form faxed per patient's request. Copy sent to scan.    Janeth Pino MBA, ATC

## 2021-08-31 ENCOUNTER — MYC MEDICAL ADVICE (OUTPATIENT)
Dept: ORTHOPEDICS | Facility: CLINIC | Age: 46
End: 2021-08-31

## 2021-09-07 ENCOUNTER — TELEPHONE (OUTPATIENT)
Dept: ORTHOPEDICS | Facility: CLINIC | Age: 46
End: 2021-09-07

## 2021-09-07 NOTE — TELEPHONE ENCOUNTER
Called patient and left message to reschedule 9/15/21 appointment with Dr.Yeo to a later date vs. With other sports med provider vs. With Dr.Kim Ila Alvarez, ATC

## 2021-09-15 ENCOUNTER — OFFICE VISIT (OUTPATIENT)
Dept: ORTHOPEDICS | Facility: CLINIC | Age: 46
End: 2021-09-15
Payer: COMMERCIAL

## 2021-09-15 ENCOUNTER — ANCILLARY PROCEDURE (OUTPATIENT)
Dept: GENERAL RADIOLOGY | Facility: CLINIC | Age: 46
End: 2021-09-15
Attending: PEDIATRICS
Payer: COMMERCIAL

## 2021-09-15 VITALS
DIASTOLIC BLOOD PRESSURE: 88 MMHG | WEIGHT: 154 LBS | BODY MASS INDEX: 22.81 KG/M2 | SYSTOLIC BLOOD PRESSURE: 110 MMHG | HEIGHT: 69 IN

## 2021-09-15 DIAGNOSIS — S62.639A CLOSED FRACTURE OF TUFT OF DISTAL PHALANX OF FINGER: Primary | ICD-10-CM

## 2021-09-15 PROCEDURE — 99213 OFFICE O/P EST LOW 20 MIN: CPT | Performed by: PEDIATRICS

## 2021-09-15 PROCEDURE — 73140 X-RAY EXAM OF FINGER(S): CPT | Mod: RT | Performed by: RADIOLOGY

## 2021-09-15 ASSESSMENT — MIFFLIN-ST. JEOR: SCORE: 1550.98

## 2021-09-15 NOTE — PROGRESS NOTES
"ASSESSMENT & PLAN    Cy was seen today for recheck.    Diagnoses and all orders for this visit:    Closed fracture of tuft of distal phalanx of finger  -     XR Finger Right G/E 2 Views; Future      Doing very well clinically, with radiographic improvement.  Activities as tolerated.  He is working fully with no issues.  Follow up as needed.  Questions answered. Discussed signs and symptoms that may indicate more serious issues; the patient was instructed to seek appropriate care if noted. Cy indicates understanding of these issues and agrees with the plan.        See Patient Instructions  Patient Instructions   Fracture continues to heal well.  Follow up is as needed.      Edwin Sanon DO  [facility] SPORTS MEDICINE [facility]    SUBJECTIVE- Interim History September 15, 2021    Chief Complaint   Patient presents with     Right Hand - RECHECK       Cy Harris is a 46 year old male who is seen in f/u up for closed fracture of tuft of distal phalanx of right middle finger. Since last visit on 8/14/21 patient has been doing really well and has returned to work.  - Now ~ 13 weeks from initial injury    Worsened by: fully bending of the finger or hitting it  Better with: Advil and tylenol  Treatments tried: ice, Tylenol, ibuprofen, previous imaging (xray 6/13/21 Images in [facility]) and finger splint  Associated symptoms: swelling, tingling and brusing    The patient is seen by themselves.  The patient is Right handed    Orthopedic/Surgical history: NO  Social History/Occupation:     No family history pertinent to patient's problem today.     REVIEW OF SYSTEMS:  Review of Systems      OBJECTIVE:  /88   Ht 1.74 m (5' 8.5\")   Wt 69.9 kg (154 lb)   BMI 23.08 kg/m       Right hand:  Full function digits, including long finger.  No pain with ROM.    RADIOLOGY:  Final results and radiologist's interpretation, available in the [facility] health record.  Images were reviewed with the patient in the " office today.  My personal interpretation of the performed imaging: healing distal phalanx fracture. Improvement on my review, when compared to previous images; fracture line less visible, with some sclerosis present.    Recent Results (from the past 24 hour(s))   XR Finger Right G/E 2 Views    Narrative    XR FINGER RT G/E 2 VW 9/15/2021 9:20 AM     HISTORY: Closed fracture of tuft of distal phalanx of finger      Impression    IMPRESSION: Long finger distal phalangeal tuft fracture, the alignment  and appearance unchanged from 8/14/2021.    KATHIE MERCER MD         SYSTEM ID:  [id number]

## 2021-09-15 NOTE — LETTER
"    9/15/2021         RE: Cy Harris  [address] [place] [address]        Dear Colleague,    Thank you for referring your patient, Cy Harris, to the [facility]. Please see a copy of my visit note below.    ASSESSMENT & PLAN    Cy was seen today for recheck.    Diagnoses and all orders for this visit:    Closed fracture of tuft of distal phalanx of finger  -     XR Finger Right G/E 2 Views; Future      Doing very well clinically, with radiographic improvement.  Activities as tolerated.  He is working fully with no issues.  Follow up as needed.  Questions answered. Discussed signs and symptoms that may indicate more serious issues; the patient was instructed to seek appropriate care if noted. Cy indicates understanding of these issues and agrees with the plan.        See Patient Instructions  Patient Instructions   Fracture continues to heal well.  Follow up is as needed.      Edwin Sanon,   [facility]    SUBJECTIVE- Interim History September 15, 2021    Chief Complaint   Patient presents with     Right Hand - RECHECK       Cy Harris is a 46 year old male who is seen in f/u up for closed fracture of tuft of distal phalanx of right middle finger. Since last visit on 8/14/21 patient has been doing really well and has returned to work.  - Now ~ 13 weeks from initial injury    Worsened by: fully bending of the finger or hitting it  Better with: Advil and tylenol  Treatments tried: ice, Tylenol, ibuprofen, previous imaging (xray 6/13/21 Images in [facility]) and finger splint  Associated symptoms: swelling, tingling and brusing    The patient is seen by themselves.  The patient is Right handed    Orthopedic/Surgical history: NO  Social History/Occupation:     No family history pertinent to patient's problem today.     REVIEW OF SYSTEMS:  Review of Systems      OBJECTIVE:  /88   Ht 1.74 m (5' 8.5\")   Wt 69.9 " kg (154 lb)   BMI 23.08 kg/m       Right hand:  Full function digits, including long finger.  No pain with ROM.    RADIOLOGY:  Final results and radiologist's interpretation, available in the [facility] health record.  Images were reviewed with the patient in the office today.  My personal interpretation of the performed imaging: healing distal phalanx fracture. Improvement on my review, when compared to previous images; fracture line less visible, with some sclerosis present.    Recent Results (from the past 24 hour(s))   XR Finger Right G/E 2 Views    Narrative    XR FINGER RT G/E 2 VW 9/15/2021 9:20 AM     HISTORY: Closed fracture of tuft of distal phalanx of finger      Impression    IMPRESSION: Long finger distal phalangeal tuft fracture, the alignment  and appearance unchanged from 8/14/2021.    KATHIE MERCER MD         SYSTEM ID:  [id number]                          Again, thank you for allowing me to participate in the care of your patient.        Sincerely,        Edwin Sanon DO

## 2021-09-26 ENCOUNTER — HEALTH MAINTENANCE LETTER (OUTPATIENT)
Age: 46
End: 2021-09-26

## 2021-10-15 ENCOUNTER — OFFICE VISIT (OUTPATIENT)
Dept: FAMILY MEDICINE | Facility: CLINIC | Age: 46
End: 2021-10-15
Payer: COMMERCIAL

## 2021-10-15 ENCOUNTER — MYC MEDICAL ADVICE (OUTPATIENT)
Dept: FAMILY MEDICINE | Facility: CLINIC | Age: 46
End: 2021-10-15

## 2021-10-15 VITALS
RESPIRATION RATE: 16 BRPM | DIASTOLIC BLOOD PRESSURE: 78 MMHG | BODY MASS INDEX: 23.11 KG/M2 | WEIGHT: 154.2 LBS | TEMPERATURE: 98 F | OXYGEN SATURATION: 100 % | SYSTOLIC BLOOD PRESSURE: 122 MMHG | HEART RATE: 83 BPM

## 2021-10-15 DIAGNOSIS — N52.9 ERECTILE DYSFUNCTION, UNSPECIFIED ERECTILE DYSFUNCTION TYPE: Primary | ICD-10-CM

## 2021-10-15 DIAGNOSIS — Z11.59 NEED FOR HEPATITIS C SCREENING TEST: ICD-10-CM

## 2021-10-15 DIAGNOSIS — Z13.220 SCREENING FOR HYPERLIPIDEMIA: ICD-10-CM

## 2021-10-15 PROCEDURE — 99213 OFFICE O/P EST LOW 20 MIN: CPT | Performed by: FAMILY MEDICINE

## 2021-10-15 RX ORDER — MONTELUKAST SODIUM 10 MG/1
10 TABLET ORAL PRN
COMMUNITY
Start: 2018-05-20

## 2021-10-15 ASSESSMENT — ENCOUNTER SYMPTOMS
DIFFICULTY URINATING: 0
PALPITATIONS: 0
SHORTNESS OF BREATH: 0
HEADACHES: 0
CONSTITUTIONAL NEGATIVE: 1

## 2021-10-15 ASSESSMENT — PAIN SCALES - GENERAL: PAINLEVEL: NO PAIN (0)

## 2021-10-15 NOTE — PROGRESS NOTES
Assessment and Plan    (N52.9) Erectile dysfunction, unspecified erectile dysfunction type  (primary encounter diagnosis)  Comment: doesn't have vascular risk factors, will check testosterone and labs.  Will need to check with flight physician about restrictions for using Viagra etc.  Plan: Testosterone Free and Total, Comprehensive         metabolic panel (BMP + Alb, Alk Phos, ALT, AST,        Total. Bili, TP)            (Z11.59) Need for hepatitis C screening test  Comment: sccreening  Plan: Hepatitis C Screen Reflex to HCV RNA Quant and         Genotype            (Z13.220) Screening for hyperlipidemia  Comment: will arrange for fasting labs  Plan: Lipid panel reflex to direct LDL Fasting              RTC in 6m for CPE    Edwin Castle MD      Richard Benoit is a 46 year old who presents for the following health issues     HPI     PATIENT IS PRESENT TODAY TO DISCUSS CONCERNS ABOUT BEING UNABLE TO OBTAIN AND SUSTAIN AN ERECTION     Has been an issue for the last year and half.  Will wake with a morning erection. Erection while masturbating will be hit or miss. Works as a , travels a lot, irregular hours.  No mood concerns.    Does have annual flight physical next month.    Denies CP, palpitations, edema, dyspnea, HA, vision changes.    Uses montelukast for allergies, prescribed by flight surgeon.    Review of Systems   Constitutional: Negative.    Eyes: Negative for visual disturbance.   Respiratory: Negative for shortness of breath.    Cardiovascular: Negative for chest pain, palpitations and peripheral edema.   Genitourinary: Negative for difficulty urinating and urgency.        ED   Neurological: Negative for headaches.            Objective    /78 (BP Location: Right arm, Patient Position: Sitting, Cuff Size: Adult Large)   Pulse 83   Temp 98  F (36.7  C) (Oral)   Resp 16   Wt 69.9 kg (154 lb 3.2 oz)   SpO2 100%   BMI 23.11 kg/m    Body mass index is 23.11 kg/m .  Physical Exam  Vitals  reviewed.   Eyes:      Conjunctiva/sclera: Conjunctivae normal.   Cardiovascular:      Rate and Rhythm: Normal rate and regular rhythm.      Heart sounds: Normal heart sounds.   Pulmonary:      Effort: Pulmonary effort is normal.      Breath sounds: Normal breath sounds.   Skin:     General: Skin is warm and dry.   Neurological:      Mental Status: He is alert and oriented to person, place, and time.

## 2021-10-19 RX ORDER — SILDENAFIL 100 MG/1
50-100 TABLET, FILM COATED ORAL DAILY PRN
Qty: 12 TABLET | Refills: 11 | Status: SHIPPED | OUTPATIENT
Start: 2021-10-19 | End: 2022-12-12

## 2021-10-21 ENCOUNTER — VIRTUAL VISIT (OUTPATIENT)
Dept: FAMILY MEDICINE | Facility: CLINIC | Age: 46
End: 2021-10-21
Payer: COMMERCIAL

## 2021-10-21 DIAGNOSIS — J01.00 ACUTE NON-RECURRENT MAXILLARY SINUSITIS: Primary | ICD-10-CM

## 2021-10-21 PROCEDURE — 99213 OFFICE O/P EST LOW 20 MIN: CPT | Mod: GT | Performed by: NURSE PRACTITIONER

## 2021-10-21 RX ORDER — DOXYCYCLINE 100 MG/1
100 CAPSULE ORAL 2 TIMES DAILY
Qty: 20 CAPSULE | Refills: 0 | Status: SHIPPED | OUTPATIENT
Start: 2021-10-21 | End: 2021-10-31

## 2021-10-21 NOTE — PROGRESS NOTES
Cy is a 46 year old who is being evaluated via a billable video visit.      How would you like to obtain your AVS? MyChart  If the video visit is dropped, the invitation should be resent by: Text to cell phone: [phone]  Will anyone else be joining your video visit? No    Video Start Time: 2:10 PM    Assessment & Plan     Acute non-recurrent maxillary sinusitis  Discussed options.  Worsening after 2 weeks of conservative management.  Start doxy.  Pt agrees with plan and verbalized understanding.  - doxycycline monohydrate (MONODOX) 100 MG capsule; Take 1 capsule (100 mg) by mouth 2 times daily for 10 days                 No follow-ups on file.    Brynn Calderon, PILAR CNP  [facility]    Subjective   Cy is a 46 year old who presents for the following health issues     HPI     Concern - Sinus infection   Onset: 2 weeks  Description: Sinus pressure, fatigue  Intensity: moderate, severe  Progression of Symptoms:  worsening  Accompanying Signs & Symptoms: Headaches  Previous history of similar problem: [facility]  Therapies tried and outcome: Mucinex D, Sudafed    Patient was given Rx for Doxycycline he has not started taking yet.    Symptoms over the past 2 weeks.  Worsening.  Sinus congestion and pressure.  No fever.  No cough.  Taking in fluids.    Review of Systems   Constitutional, HEENT, cardiovascular, pulmonary, gi and gu systems are negative, except as otherwise noted.      Objective           Vitals:  No vitals were obtained today due to virtual visit.    Physical Exam   GENERAL: Healthy, alert and no distress  EYES: Eyes grossly normal to inspection.  No discharge or erythema, or obvious scleral/conjunctival abnormalities.  RESP: No audible wheeze, cough, or visible cyanosis.  No visible retractions or increased work of breathing.    SKIN: Visible skin clear. No significant rash, abnormal pigmentation or lesions.  NEURO: Cranial nerves grossly  intact.  Mentation and speech appropriate for age.  PSYCH: Mentation appears normal, affect normal/bright, judgement and insight intact, normal speech and appearance well-groomed.                Video-Visit Details    Type of service:  Video Visit    Video End Time:2:19 PM    Originating Location (pt. Location): Home    Distant Location (provider location):  [facility] [employer]     Platform used for Video Visit: [employer]

## 2022-01-04 ENCOUNTER — OFFICE VISIT (OUTPATIENT)
Dept: URGENT CARE | Facility: URGENT CARE | Age: 47
End: 2022-01-04
Payer: COMMERCIAL

## 2022-01-04 VITALS
BODY MASS INDEX: 23.22 KG/M2 | SYSTOLIC BLOOD PRESSURE: 141 MMHG | WEIGHT: 155 LBS | HEART RATE: 74 BPM | RESPIRATION RATE: 16 BRPM | DIASTOLIC BLOOD PRESSURE: 97 MMHG | OXYGEN SATURATION: 99 % | TEMPERATURE: 98.1 F

## 2022-01-04 DIAGNOSIS — H92.02 OTALGIA, LEFT: Primary | ICD-10-CM

## 2022-01-04 PROCEDURE — 99213 OFFICE O/P EST LOW 20 MIN: CPT | Performed by: FAMILY MEDICINE

## 2022-01-04 NOTE — PATIENT INSTRUCTIONS
Afrin nasal spray:  2 puffs in each nostril in the morning and again about 30 minutes before bed for the next 3 days.    Ibuprofen 600 mg three times daily for the next 3 days.

## 2022-01-04 NOTE — PROGRESS NOTES
ASSESSMENT:    ICD-10-CM    1. Otalgia, left  H92.02      No evidence of middle ear effusion at this time.    PLAN:  Patient Instructions   Afrin nasal spray:  2 puffs in each nostril in the morning and again about 30 minutes before bed for the next 3 days.    Ibuprofen 600 mg three times daily for the next 3 days.    SUBJECTIVE:  Cy Harris is a 46 year old male who presents to  with right ear pain for several days.  No fever.  No drainage.  Diagnosed with COVID last week but symptoms started right around [place].    OBJECTIVE:  BP (!) 141/97   Pulse 74   Temp 98.1  F (36.7  C) (Oral)   Resp 16   Wt 70.3 kg (155 lb)   SpO2 99%   BMI 23.22 kg/m    GEN: well-appearing, in NAD  ENT: normal TMs, normal canals

## 2022-01-04 NOTE — TELEPHONE ENCOUNTER
----- Message from Sony Perkins sent at 12/24/2017  9:57 AM CST -----  Reason for call:  Other   Patient called regarding (reason for call): Patient calling wondering can he take two medications for head cold symptoms.   Additional comment No.    Phone number to reach patient:  Home number on file [phone] (home)    Best Time:  Anytime.    Can we leave a detailed message on this number?  YES   ED Attending Physician Note      Patient : Debbie Stock Age: 47 year old Sex: female   MRN: [id number] Encounter Date: 1/4/2022      History     Chief Complaint   Patient presents with   • Abdominal Pain     HPI   46 yo female, pmhx of htn, presents to the Ed with c/o new onset abd pain for the last day. Pt reports pain is worse with food and pt has not had anything to eat since 9:30 yesterday. + pt had had a small amount of pedialyte. Pt c/o vomiting with oral intake. Pt denies f/c.   Pt denies cough, chest pain, sob.   Social Hx: no smoking, etoh, drugs  Psh: appendicits    No Known Allergies    No past medical history on file.    No past surgical history on file.    No family history on file.    Social History     Tobacco Use   • Smoking status: Not on file   • Smokeless tobacco: Not on file   Substance Use Topics   • Alcohol use: Not on file   • Drug use: Not on file       Review of Systems   Constitutional: Negative for chills and fever.   HENT: Negative for nosebleeds and voice change.    Eyes: Negative for discharge and redness.   Respiratory: Negative for cough and shortness of breath.    Cardiovascular: Negative for chest pain and palpitations.   Gastrointestinal: Positive for abdominal pain and vomiting.   Genitourinary: Negative for dysuria and hematuria.   Musculoskeletal: Negative for back pain and neck pain.   Skin: Negative for color change and rash.   Allergic/Immunologic: Negative for food allergies.   Neurological: Negative for speech difficulty and headaches.       Physical Exam     ED Triage Vitals [01/03/22 1438]   ED Triage Vitals Group      Temp 98.4 °F (36.9 °C)      Heart Rate (!) 50      Resp 18      /75      SpO2 99 %      EtCO2 mmHg       Height       Weight       Weight Scale Used       BMI (Calculated)       IBW/kg (Calculated)        Physical Exam  Vitals and nursing note reviewed.   Constitutional:       Appearance: Normal appearance.   HENT:      Head: Normocephalic and  atraumatic.      Mouth/Throat:      Mouth: Mucous membranes are moist.      Pharynx: Oropharynx is clear.   Eyes:      Extraocular Movements: Extraocular movements intact.      Conjunctiva/sclera: Conjunctivae normal.      Pupils: Pupils are equal, round, and reactive to light.   Cardiovascular:      Rate and Rhythm: Normal rate and regular rhythm.      Pulses: Normal pulses.      Heart sounds: Normal heart sounds.   Pulmonary:      Effort: Pulmonary effort is normal.      Breath sounds: Normal breath sounds.   Abdominal:      General: Bowel sounds are normal. There is no distension.      Palpations: Abdomen is soft. There is no mass.      Tenderness: There is abdominal tenderness in the right upper quadrant. There is no guarding or rebound. Positive signs include Jacobson's sign.   Musculoskeletal:         General: Normal range of motion.      Cervical back: Normal range of motion and neck supple.   Lymphadenopathy:      Comments: Hydradenitis in the b/l axilla   And groin area  Left axilla is actively draining   Skin:     General: Skin is warm and dry.   Neurological:      General: No focal deficit present.      Mental Status: She is alert and oriented to person, place, and time.   Psychiatric:         Mood and Affect: Mood normal.         Behavior: Behavior normal.         ED Course     Procedures    Lab Results     Results for orders placed or performed during the hospital encounter of 01/04/22   Comprehensive Metabolic Panel   Result Value Ref Range    Fasting Status      Sodium 136 135 - 145 mmol/L    Potassium 3.7 3.4 - 5.1 mmol/L    Chloride 104 98 - 107 mmol/L    Carbon Dioxide 25 21 - 32 mmol/L    Anion Gap 11 10 - 20 mmol/L    Glucose 114 (H) 70 - 99 mg/dL    BUN 11 6 - 20 mg/dL    Creatinine 0.64 0.51 - 0.95 mg/dL    Glomerular Filtration Rate >90 >=60    BUN/ Creatinine Ratio 17 7 - 25    Calcium 9.2 8.4 - 10.2 mg/dL    Bilirubin, Total 3.6 (H) 0.2 - 1.0 mg/dL    GOT/AST 97 (H) <=37 Units/L    GPT/  (H) <64 Units/L    Alkaline Phosphatase 147 (H) 45 - 117 Units/L    Albumin 3.6 3.6 - 5.1 g/dL    Protein, Total 7.4 6.4 - 8.2 g/dL    Globulin 3.8 2.0 - 4.0 g/dL    A/G Ratio 0.9 (L) 1.0 - 2.4   Lipase   Result Value Ref Range    Lipase 76 73 - 393 Units/L   CBC with Automated Differential (performable only)   Result Value Ref Range    WBC 14.7 (H) 4.2 - 11.0 K/mcL    RBC 4.38 4.00 - 5.20 mil/mcL    HGB 12.9 12.0 - 15.5 g/dL    HCT 39.2 36.0 - 46.5 %    MCV 89.5 78.0 - 100.0 fl    MCH 29.5 26.0 - 34.0 pg    MCHC 32.9 32.0 - 36.5 g/dL    RDW-CV 13.1 11.0 - 15.0 %    RDW-SD 42.6 39.0 - 50.0 fL     140 - 450 K/mcL    NRBC 0 <=0 /100 WBC    Neutrophil, Percent 86 %    Lymphocytes, Percent 9 %    Mono, Percent 4 %    Eosinophils, Percent 0 %    Basophils, Percent 0 %    Immature Granulocytes 1 %    Absolute Neutrophils 12.7 (H) 1.8 - 7.7 K/mcL    Absolute Lymphocytes 1.3 1.0 - 4.8 K/mcL    Absolute Monocytes 0.6 0.3 - 0.9 K/mcL    Absolute Eosinophils  0.0 0.0 - 0.5 K/mcL    Absolute Basophils 0.0 0.0 - 0.3 K/mcL    Absolute Immmature Granulocytes 0.1 0.0 - 0.2 K/mcL   TROPONIN I, HIGH SENSITIVITY   Result Value Ref Range    Troponin I, High Sensitivity 4 <52 ng/L   Rapid SARS-CoV-2 by PCR    Specimen: Nasal, Mid-turbinate; Swab   Result Value Ref Range    Rapid SARS-COV-2 by PCR Not Detected Not Detected / Detected / Presumptive Positive / Inhibitors present    Isolation Guidelines      Procedural Comment         EKG Results         Radiology Results     Imaging Results          US LIVER GALLBLADDER PANCREAS (Final result)  Result time 01/04/22 08:33:20    Final result                 Impression:    1.    Gallbladder distention and gallbladder wall thickening, but negative  sonographic Jacobson sign.  Findings are suspicious for, but not diagnostic  of acute cholecystitis.      2.    Intrahepatic biliary ductal dilatation, etiology of which is  indeterminate.    Given ultrasound suspicious for acute cholecystitis,  intrahepatic biliary  ductal dilatation, as well as elevated total bilirubin level, recommend  further evaluation with MRCP.      Thank you for the opportunity to participate in the care of your patient.  Please contact us if we can be of further assistance.      Electronically Signed by: LALO GRAHAM M.D.   Signed on: 1/4/2022 8:33 AM                Narrative:    EXAM: US LIVER GALLBLADDER PANCREAS    CLINICAL HISTORY:   ruq pain, leukocytosis    COMPARISON:  None.    TECHNIQUE:  Real-time grayscale ultrasound is performed using a transabdominal  curvilinear transducer. Color Doppler interrogation was also performed.    FINDINGS:  The liver demonstrates normal echogenicity and echotexture.  No evidence of  a definitive mass lesion in the visualized portion of the liver.   There is  intrahepatic biliary ductal dilatation, etiology of which is indeterminate.  The liver measures cm in sagittal span.    The main portal vein is widely patent and demonstrates normal hepatopetal  flow.     Gallbladder is distended with sludge or small stones.  The gallbladder  wall is thickened up to 4 mm, but there is no sonographic Jacobson's sign.   The common bile duct measures 8 mm.  The patient was not focally tender at  the time of the examination.     Pancreas is not well-visualized due to overlying bowel gas.    The right kidney is normal in echogenicity and without hydronephrosis.  It  measures 10.5 cm.    There is no free fluid in the visualized abdomen.                                ED Medication Orders (From admission, onward)    None          ED Course as of 01/04/22 1512   Mon Jan 03, 2022   1709 I have entered orders to expedite patient care due to limited ED space capability. I have not examined or personally evaluated this patient.    [VS]   Tue Jan 04, 2022   1258 D/w MAR, will put GI  on consult   [HW]   1300 Will start abx per surgery recs. Ceftriaxone and flagyl [HW]      ED Course User Index  [HW] Natali Bello,  MD  [VS] Drake Ortiz, MELISA       [facility]  Number of Diagnoses or Management Options  Diagnosis management comments: Pcp: nos  '  Imp: cholecystitis, b/l hydradenitis  Will d/w gen surgery      Clinical Impression     No diagnosis found.    Disposition      There are no discharge medications for this patient.      There are no discharge medications for this patient.      There is no disposition no dispo time  There is no comment      Teaching-Supervisory Addendum    I participated in the following activities of this patient's care: the medical history, the physical exam, medical decision making.  I personally performed: Supervision of the patient's care, the history, the physical exam, the medical decision making  The case was discussed with: The resident.  Procedures: I was present for key portions of the procedure and was immediately available for the non-key portions.  Evaluation and management service: I agree with the evaluation and management decisions made in this patient's care.          Natali Bello MD  1/4/2022 10:44 AM                      Natali Bello MD  01/04/22 1519

## 2022-01-14 ENCOUNTER — VIRTUAL VISIT (OUTPATIENT)
Dept: FAMILY MEDICINE | Facility: CLINIC | Age: 47
End: 2022-01-14
Payer: COMMERCIAL

## 2022-01-14 DIAGNOSIS — J01.90 ACUTE SINUSITIS WITH SYMPTOMS > 10 DAYS: Primary | ICD-10-CM

## 2022-01-14 PROCEDURE — 99213 OFFICE O/P EST LOW 20 MIN: CPT | Mod: GT | Performed by: FAMILY MEDICINE

## 2022-01-14 RX ORDER — AZITHROMYCIN 250 MG/1
TABLET, FILM COATED ORAL
Qty: 6 TABLET | Refills: 0 | Status: SHIPPED | OUTPATIENT
Start: 2022-01-14 | End: 2022-03-17

## 2022-01-14 NOTE — PROGRESS NOTES
Cy is a 46 year old who is being evaluated via a billable video visit.      How would you like to obtain your AVS? MyChart  If the video visit is dropped, the invitation should be resent by: Text to cell phone: [phone]  Will anyone else be joining your video visit? No      Video Start Time: 11:40    Assessment & Plan     Acute sinusitis with symptoms > 10 days    - azithromycin (ZITHROMAX) 250 MG tablet; Two tablets first day, then one tablet daily for four days.             No follow-ups on file.    Hernan Weiss MD  [facility]    Richard Benoit is a 46 year old who presents for the following health issues     HPI     Acute Illness  Acute illness concerns: Sinus problems   Onset/Duration: lingering- tested pos for Covid 12/27 - as a  has not been able to go back to work -  Seen [facility]  1/4 given Afirin nasal spray - ibuprofen-  No help -Then, again went to [employer]  -1/11- No Rx given   Symptoms:  Fever: no  Chills/Sweats: no  Headache (location?): no  Sinus Pressure: no- mild post nasal drainage   Conjunctivitis:  no  Ear Pain: YES: right- no pain just muffled and pressure unable to fly   Rhinorrhea: no  Congestion: YES- nasal/ear   Sore Throat: no  Cough: no  Wheeze: no  Decreased Appetite: no  Nausea: no  Vomiting: no  Diarrhea: no  Dysuria/Freq.: no  Dysuria or Hematuria: no  Fatigue/Achiness: YES- mild   Sick/Strep Exposure: no  Therapies tried and outcome: , afrin, mucinex   Patient does not have fever will continue to feel some postnasal drainage.  He continues to have one-sided discomfort.  He does not have any fever or chills.    Review of Systems         Objective           Vitals:  No vitals were obtained today due to virtual visit.    Physical Exam   GENERAL: Healthy, alert and no distress  EYES: Eyes grossly normal to inspection.  No discharge or erythema, or obvious scleral/conjunctival abnormalities.  RESP: No audible wheeze, cough, or visible cyanosis.   No visible retractions or increased work of breathing.    SKIN: Visible skin clear. No significant rash, abnormal pigmentation or lesions.  NEURO: Cranial nerves grossly intact.  Mentation and speech appropriate for age.  PSYCH: Mentation appears normal, affect normal/bright, judgement and insight intact, normal speech and appearance well-groomed.  Sinus tenderness noted              Video-Visit Details    Type of service:  Video Visit    Video End Time:12:06 PM    Originating Location (pt. Location): Home    Distant Location (provider location):  [facility]     Platform used for Video Visit: [employer]

## 2022-01-27 ENCOUNTER — LAB (OUTPATIENT)
Dept: LAB | Facility: CLINIC | Age: 47
End: 2022-01-27
Payer: COMMERCIAL

## 2022-01-27 DIAGNOSIS — N52.9 ERECTILE DYSFUNCTION, UNSPECIFIED ERECTILE DYSFUNCTION TYPE: ICD-10-CM

## 2022-01-27 DIAGNOSIS — Z11.59 NEED FOR HEPATITIS C SCREENING TEST: ICD-10-CM

## 2022-01-27 DIAGNOSIS — Z13.220 SCREENING FOR HYPERLIPIDEMIA: ICD-10-CM

## 2022-01-27 LAB
ALBUMIN SERPL-MCNC: 4.3 G/DL (ref 3.4–5)
ALP SERPL-CCNC: 43 U/L (ref 40–150)
ALT SERPL W P-5'-P-CCNC: 17 U/L (ref 0–70)
ANION GAP SERPL CALCULATED.3IONS-SCNC: 7 MMOL/L (ref 3–14)
AST SERPL W P-5'-P-CCNC: 14 U/L (ref 0–45)
BILIRUB SERPL-MCNC: 0.4 MG/DL (ref 0.2–1.3)
BUN SERPL-MCNC: 17 MG/DL (ref 7–30)
CALCIUM SERPL-MCNC: 9.3 MG/DL (ref 8.5–10.1)
CHLORIDE BLD-SCNC: 105 MMOL/L (ref 94–109)
CHOLEST SERPL-MCNC: 196 MG/DL
CO2 SERPL-SCNC: 26 MMOL/L (ref 20–32)
CREAT SERPL-MCNC: 0.91 MG/DL (ref 0.66–1.25)
FASTING STATUS PATIENT QL REPORTED: YES
GFR SERPL CREATININE-BSD FRML MDRD: >90 ML/MIN/1.73M2
GLUCOSE BLD-MCNC: 92 MG/DL (ref 70–99)
HCV AB SERPL QL IA: NONREACTIVE
HDLC SERPL-MCNC: 55 MG/DL
LDLC SERPL CALC-MCNC: 127 MG/DL
NONHDLC SERPL-MCNC: 141 MG/DL
POTASSIUM BLD-SCNC: 3.6 MMOL/L (ref 3.4–5.3)
PROT SERPL-MCNC: 7.6 G/DL (ref 6.8–8.8)
SHBG SERPL-SCNC: 45 NMOL/L (ref 11–80)
SODIUM SERPL-SCNC: 138 MMOL/L (ref 133–144)
TRIGL SERPL-MCNC: 71 MG/DL

## 2022-01-27 PROCEDURE — 36415 COLL VENOUS BLD VENIPUNCTURE: CPT

## 2022-01-27 PROCEDURE — 84270 ASSAY OF SEX HORMONE GLOBUL: CPT

## 2022-01-27 PROCEDURE — 80053 COMPREHEN METABOLIC PANEL: CPT

## 2022-01-27 PROCEDURE — 84403 ASSAY OF TOTAL TESTOSTERONE: CPT

## 2022-01-27 PROCEDURE — 80061 LIPID PANEL: CPT

## 2022-01-27 PROCEDURE — 86803 HEPATITIS C AB TEST: CPT

## 2022-02-01 LAB
TESTOST FREE SERPL-MCNC: 4.6 NG/DL
TESTOST SERPL-MCNC: 286 NG/DL (ref 240–950)

## 2022-03-17 ENCOUNTER — OFFICE VISIT (OUTPATIENT)
Dept: FAMILY MEDICINE | Facility: CLINIC | Age: 47
End: 2022-03-17
Payer: COMMERCIAL

## 2022-03-17 VITALS
DIASTOLIC BLOOD PRESSURE: 80 MMHG | OXYGEN SATURATION: 97 % | HEIGHT: 69 IN | SYSTOLIC BLOOD PRESSURE: 110 MMHG | WEIGHT: 154.1 LBS | HEART RATE: 63 BPM | TEMPERATURE: 98.2 F | RESPIRATION RATE: 16 BRPM | BODY MASS INDEX: 22.82 KG/M2

## 2022-03-17 DIAGNOSIS — Z12.11 SCREEN FOR COLON CANCER: ICD-10-CM

## 2022-03-17 DIAGNOSIS — Z00.00 ROUTINE GENERAL MEDICAL EXAMINATION AT A HEALTH CARE FACILITY: Primary | ICD-10-CM

## 2022-03-17 PROCEDURE — 99396 PREV VISIT EST AGE 40-64: CPT | Performed by: FAMILY MEDICINE

## 2022-03-17 ASSESSMENT — ENCOUNTER SYMPTOMS
WEAKNESS: 0
HEMATURIA: 0
HEARTBURN: 0
DIARRHEA: 0
CONSTIPATION: 0
SORE THROAT: 0
COUGH: 0
ARTHRALGIAS: 0
PARESTHESIAS: 0
JOINT SWELLING: 0
HEADACHES: 0
PALPITATIONS: 0
HEMATOCHEZIA: 0
ABDOMINAL PAIN: 0
SHORTNESS OF BREATH: 0
NAUSEA: 0
CHILLS: 0
DIZZINESS: 0
MYALGIAS: 0
EYE PAIN: 0
NERVOUS/ANXIOUS: 0
FEVER: 0
FREQUENCY: 0
DYSURIA: 0

## 2022-03-17 ASSESSMENT — PAIN SCALES - GENERAL: PAINLEVEL: NO PAIN (0)

## 2022-03-17 NOTE — PROGRESS NOTES
SUBJECTIVE:   CC: Cy Harris is an 46 year old male who presents for preventative health visit.     {Patient has been advised of split billing requirements and indicates understanding: Yes  Healthy Habits:     Getting at least 3 servings of Calcium per day:  Yes    Bi-annual eye exam:  NO    Dental care twice a year:  NO    Sleep apnea or symptoms of sleep apnea:  Daytime drowsiness    Diet:  Regular (no restrictions)    Frequency of exercise:  1 day/week    Duration of exercise:  30-45 minutes    Taking medications regularly:  Yes    Medication side effects:  Not applicable    PHQ-2 Total Score: 0    Additional concerns today:  No      Reviewed labs, especially cholesterol.    Did have COVID in December.          Today's PHQ-2 Score:   PHQ-2 ( 1999 Pfizer) 3/17/2022   Q1: Little interest or pleasure in doing things 0   Q2: Feeling down, depressed or hopeless 0   PHQ-2 Score 0   PHQ-2 Total Score (12-17 Years)- Positive if 3 or more points; Administer PHQ-A if positive -   Q1: Little interest or pleasure in doing things Not at all   Q2: Feeling down, depressed or hopeless Not at all   PHQ-2 Score 0       Abuse: Current or Past(Physical, Sexual or Emotional)- No  Do you feel safe in your environment? Yes    Have you ever done Advance Care Planning? (For example, a Health Directive, POLST, or a discussion with a medical provider or your loved ones about your wishes): No, advance care planning information given to patient to review.  Patient declined advance care planning discussion at this time.    Social History     Tobacco Use     Smoking status: Never Smoker     Smokeless tobacco: Never Used   Substance Use Topics     Alcohol use: Yes     If you drink alcohol do you typically have >3 drinks per day or >7 drinks per week? No    Alcohol Use 3/17/2022   Prescreen: >3 drinks/day or >7 drinks/week? No       Last PSA: No results found for: PSA    Reviewed orders with patient. Reviewed health maintenance and updated  "orders accordingly - Yes  Lab work is in process  Labs reviewed in EPIC    Reviewed and updated as needed this visit by clinical staff   Tobacco  Allergies  Meds   Med Hx  Surg Hx  Fam Hx  Soc Hx        Reviewed and updated as needed this visit by Provider                     Review of Systems   Constitutional: Negative for chills and fever.   HENT: Negative for congestion, ear pain, hearing loss and sore throat.    Eyes: Positive for visual disturbance. Negative for pain.   Respiratory: Negative for cough and shortness of breath.    Cardiovascular: Negative for chest pain, palpitations and peripheral edema.   Gastrointestinal: Negative for abdominal pain, constipation, diarrhea, heartburn, hematochezia and nausea.   Genitourinary: Positive for impotence. Negative for dysuria, frequency, genital sores, hematuria, penile discharge and urgency.   Musculoskeletal: Negative for arthralgias, joint swelling and myalgias.   Skin: Negative for rash.   Neurological: Negative for dizziness, weakness, headaches and paresthesias.   Psychiatric/Behavioral: Negative for mood changes. The patient is not nervous/anxious.          OBJECTIVE:   /80 (BP Location: Right arm, Patient Position: Sitting, Cuff Size: Adult Large)   Pulse 63   Temp 98.2  F (36.8  C) (Oral)   Resp 16   Ht 1.74 m (5' 8.5\")   Wt 69.9 kg (154 lb 1.6 oz)   SpO2 97%   BMI 23.09 kg/m      Physical Exam  Constitutional:       General: He is not in acute distress.     Appearance: He is well-developed.   HENT:      Right Ear: Tympanic membrane and external ear normal.      Left Ear: Tympanic membrane and external ear normal.      Nose: Nose normal.      Mouth/Throat:      Mouth: No oral lesions.      Pharynx: No oropharyngeal exudate.   Eyes:      General:         Right eye: No discharge.         Left eye: No discharge.      Conjunctiva/sclera: Conjunctivae normal.      Pupils: Pupils are equal, round, and reactive to light.   Neck:      Thyroid: No " "thyromegaly.      Trachea: No tracheal deviation.   Cardiovascular:      Rate and Rhythm: Normal rate and regular rhythm.      Pulses: Normal pulses.      Heart sounds: Normal heart sounds, S1 normal and S2 normal. No murmur heard.    No S3 or S4 sounds.   Pulmonary:      Effort: Pulmonary effort is normal. No respiratory distress.      Breath sounds: Normal breath sounds. No wheezing or rales.   Abdominal:      General: Bowel sounds are normal.      Palpations: Abdomen is soft. There is no mass.      Tenderness: There is no abdominal tenderness.   Musculoskeletal:         General: No deformity. Normal range of motion.      Cervical back: Neck supple.   Lymphadenopathy:      Cervical: No cervical adenopathy.   Skin:     General: Skin is warm and dry.      Findings: No lesion or rash.   Neurological:      Mental Status: He is alert and oriented to person, place, and time.      Motor: No abnormal muscle tone.      Deep Tendon Reflexes: Reflexes are normal and symmetric.   Psychiatric:         Speech: Speech normal.         Thought Content: Thought content normal.         Judgment: Judgment normal.           Diagnostic Test Results:  Labs reviewed in Epic    ASSESSMENT/PLAN:       ICD-10-CM    1. Routine general medical examination at a health care facility  Z00.00 COVID-19,PF,MODERNA (18+ YRS BOOSTER .25ML)   2. Screen for colon cancer  Z12.11 Adult Gastro Ref - Procedure Only       Patient has been advised of split billing requirements and indicates understanding: Yes    COUNSELING:   Reviewed preventive health counseling, as reflected in patient instructions       Regular exercise       Healthy diet/nutrition    Estimated body mass index is 23.09 kg/m  as calculated from the following:    Height as of this encounter: 1.74 m (5' 8.5\").    Weight as of this encounter: 69.9 kg (154 lb 1.6 oz).         He reports that he has never smoked. He has never used smokeless tobacco.      Counseling Resources:  ATP IV " Guidelines  Pooled Cohorts Equation Calculator  FRAX Risk Assessment  ICSI Preventive Guidelines  Dietary Guidelines for Americans, 2010  USDA's MyPlate  ASA Prophylaxis  Lung CA Screening    Edwin Castle MD  [facility]

## 2022-11-14 ENCOUNTER — VIRTUAL VISIT (OUTPATIENT)
Dept: INTERNAL MEDICINE | Facility: CLINIC | Age: 47
End: 2022-11-14
Payer: COMMERCIAL

## 2022-11-14 DIAGNOSIS — J01.90 ACUTE SINUSITIS, RECURRENCE NOT SPECIFIED, UNSPECIFIED LOCATION: Primary | ICD-10-CM

## 2022-11-14 PROCEDURE — 99213 OFFICE O/P EST LOW 20 MIN: CPT | Mod: GT | Performed by: NURSE PRACTITIONER

## 2022-11-14 RX ORDER — AZITHROMYCIN 250 MG/1
TABLET, FILM COATED ORAL
Qty: 6 TABLET | Refills: 0 | Status: SHIPPED | OUTPATIENT
Start: 2022-11-14

## 2022-11-14 NOTE — PROGRESS NOTES
Cy is a 47 year old who is being evaluated via a billable video visit.      How would you like to obtain your AVS? MyChart  If the video visit is dropped, the invitation should be resent by: Text to cell phone: [phone]  Will anyone else be joining your video visit? No        Assessment & Plan     Acute sinusitis, recurrence not specified, unspecified location    - azithromycin (ZITHROMAX) 250 MG tablet; Two tablets first day, then one tablet daily for four days.      Continue current meds, home cares.  Consider ENT consult           Lizeth Davenport NP  [facility]    Richard Benoit is a 47 year old, presenting for the following health issues:  Sinus Problem (Possible sinus infection - chronic)      Sinus Problem     History of Present Illness       Reason for visit:  A sinus issue  Symptom onset:  3-7 days ago  Symptoms include:  Post nasal drainage, congestion, blowing out thick yelliwish/green mucous for a week now.  Today i feel more tired  Symptom intensity:  Moderate  Symptom progression:  Staying the same  Had these symptoms before:  No  What makes it worse:  Sitting down or lying down  What makes it better:  Standing up or moving around although today i feel like my equilibrium is off a bit    He eats 0-1 servings of fruits and vegetables daily.He consumes 2 sweetened beverage(s) daily.He exercises with enough effort to increase his heart rate 20 to 29 minutes per day.  He exercises with enough effort to increase his heart rate 3 or less days per week.   He is taking medications regularly.       Concern - sinusitis  Onset: 7 days  Description: nasal, sinus congestiin, yellow drainage, ST, dry cough  Intensity: moderate  Progression of Symptoms:  worsening  Accompanying Signs & Symptoms: afebrile  Previous history of similar problem: h/o sinusitis   Precipitating factors:        Worsened by: flying, is a   Alleviating factors:        Improved by: flonase, singulair,  musinex D  Therapies tried and outcome: see above        Review of Systems   CONSTITUTIONAL: NEGATIVE for fever, chills, change in weight  RESP:NEGATIVE for SOB/dyspnea and wheezing  CV: NEGATIVE for chest pain, palpitations or peripheral edema      Objective           Vitals:  No vitals were obtained today due to virtual visit.    Physical Exam   GENERAL: Healthy, alert and no distress  EYES: Eyes grossly normal to inspection.  No discharge or erythema, or obvious scleral/conjunctival abnormalities.  RESP: No audible wheeze, cough, or visible cyanosis.  No visible retractions or increased work of breathing.    PSYCH: Mentation appears normal, affect normal/bright, judgement and insight intact, normal speech and appearance well-groomed.                Video-Visit Details    Video Start Time: 1108    Type of service:  Video Visit    Video End Time:1118    Originating Location (pt. Location): Home    Distant Location (provider location):  On-site    Platform used for Video Visit: Ale

## 2022-12-08 ENCOUNTER — MYC MEDICAL ADVICE (OUTPATIENT)
Dept: FAMILY MEDICINE | Facility: CLINIC | Age: 47
End: 2022-12-08

## 2022-12-08 DIAGNOSIS — N52.9 ERECTILE DYSFUNCTION, UNSPECIFIED ERECTILE DYSFUNCTION TYPE: ICD-10-CM

## 2022-12-12 RX ORDER — SILDENAFIL 100 MG/1
50-100 TABLET, FILM COATED ORAL DAILY PRN
Qty: 12 TABLET | Refills: 1 | Status: SHIPPED | OUTPATIENT
Start: 2022-12-12

## 2022-12-12 NOTE — TELEPHONE ENCOUNTER
Prescription approved per [facility] Refill Protocol.  Renetta Conway RN, BSN  [facility]

## 2023-04-23 ENCOUNTER — HEALTH MAINTENANCE LETTER (OUTPATIENT)
Age: 48
End: 2023-04-23

## 2024-06-29 ENCOUNTER — HEALTH MAINTENANCE LETTER (OUTPATIENT)
Age: 49
End: 2024-06-29

## 2025-02-03 ENCOUNTER — MYC MEDICAL ADVICE (OUTPATIENT)
Dept: FAMILY MEDICINE | Facility: CLINIC | Age: 50
End: 2025-02-03
Payer: COMMERCIAL

## 2025-02-03 DIAGNOSIS — N52.9 ERECTILE DYSFUNCTION, UNSPECIFIED ERECTILE DYSFUNCTION TYPE: ICD-10-CM

## 2025-02-03 RX ORDER — SILDENAFIL 100 MG/1
50-100 TABLET, FILM COATED ORAL DAILY PRN
Qty: 12 TABLET | Refills: 5 | Status: SHIPPED | OUTPATIENT
Start: 2025-02-03

## 2025-02-17 ENCOUNTER — E-VISIT (OUTPATIENT)
Dept: URGENT CARE | Facility: CLINIC | Age: 50
End: 2025-02-17
Payer: COMMERCIAL

## 2025-02-17 DIAGNOSIS — J01.90 ACUTE SINUSITIS WITH SYMPTOMS > 10 DAYS: Primary | ICD-10-CM

## 2025-02-17 RX ORDER — DOXYCYCLINE HYCLATE 100 MG
100 TABLET ORAL 2 TIMES DAILY
Qty: 14 TABLET | Refills: 0 | Status: SHIPPED | OUTPATIENT
Start: 2025-02-17 | End: 2025-02-24

## 2025-02-17 NOTE — PATIENT INSTRUCTIONS
Thank you for choosing us for your care. I have placed an order for a prescription so that you can start treatment:  Orders Placed This Encounter   Medications     doxycycline hyclate (VIBRA-TABS) 100 MG tablet     Sig: Take 1 tablet (100 mg) by mouth 2 times daily for 7 days.     Dispense:  14 tablet     Refill:  0     May substitute any formulation of 100mg doxycycline available and best covered by insurance          View your full visit summary for details by clicking on the link below. Your pharmacist will able to address any questions you may have about the medication.     If you're not feeling better within 5-7 days, please schedule an appointment.  You can schedule an appointment right here in [facility], or call [phone]  If the visit is for the same symptoms as your eVisit, we'll refund the cost of your eVisit if seen within seven days.

## 2025-03-12 ENCOUNTER — TELEPHONE (OUTPATIENT)
Dept: PEDIATRICS | Facility: CLINIC | Age: 50
End: 2025-03-12
Payer: COMMERCIAL

## 2025-03-12 NOTE — TELEPHONE ENCOUNTER
Pt calling to request hep B and tetanus vaccines be completed.     Assisted pt in scheduling a MA visit on 3/17 at 9:30 AM.     Pt verbalizes understanding and is agreeable with plan. Pt denies any further questions or concerns at this time.     Domonique TRUONG RN   Clinic RN  [facility]

## 2025-03-17 ENCOUNTER — ALLIED HEALTH/NURSE VISIT (OUTPATIENT)
Dept: PEDIATRICS | Facility: CLINIC | Age: 50
End: 2025-03-17
Payer: COMMERCIAL

## 2025-03-17 DIAGNOSIS — Z23 NEED FOR VACCINATION: Primary | ICD-10-CM

## 2025-03-17 PROCEDURE — 90715 TDAP VACCINE 7 YRS/> IM: CPT

## 2025-03-17 PROCEDURE — 90471 IMMUNIZATION ADMIN: CPT

## 2025-03-17 PROCEDURE — 99207 PR NO CHARGE NURSE ONLY: CPT

## 2025-07-13 ENCOUNTER — HEALTH MAINTENANCE LETTER (OUTPATIENT)
Age: 50
End: 2025-07-13